# Patient Record
Sex: FEMALE | Race: WHITE | NOT HISPANIC OR LATINO | Employment: OTHER | URBAN - METROPOLITAN AREA
[De-identification: names, ages, dates, MRNs, and addresses within clinical notes are randomized per-mention and may not be internally consistent; named-entity substitution may affect disease eponyms.]

---

## 2019-01-15 ENCOUNTER — OFFICE VISIT (OUTPATIENT)
Dept: FAMILY MEDICINE | Facility: CLINIC | Age: 79
End: 2019-01-15
Payer: COMMERCIAL

## 2019-01-15 VITALS
HEIGHT: 64 IN | BODY MASS INDEX: 25.1 KG/M2 | SYSTOLIC BLOOD PRESSURE: 132 MMHG | WEIGHT: 147 LBS | OXYGEN SATURATION: 97 % | TEMPERATURE: 98 F | HEART RATE: 80 BPM | DIASTOLIC BLOOD PRESSURE: 66 MMHG

## 2019-01-15 DIAGNOSIS — R26.9 GAIT DISTURBANCE: Primary | ICD-10-CM

## 2019-01-15 DIAGNOSIS — K50.90 CROHN'S DISEASE WITHOUT COMPLICATION, UNSPECIFIED GASTROINTESTINAL TRACT LOCATION (H): ICD-10-CM

## 2019-01-15 DIAGNOSIS — Z86.73 HISTORY OF CVA (CEREBROVASCULAR ACCIDENT): ICD-10-CM

## 2019-01-15 DIAGNOSIS — Z87.74: ICD-10-CM

## 2019-01-15 PROCEDURE — 99203 OFFICE O/P NEW LOW 30 MIN: CPT | Performed by: FAMILY MEDICINE

## 2019-01-15 RX ORDER — CLOPIDOGREL BISULFATE 75 MG/1
75 TABLET ORAL DAILY
Refills: 1 | COMMUNITY
Start: 2019-01-02 | End: 2019-01-15

## 2019-01-15 RX ORDER — CHLORAL HYDRATE 500 MG
CAPSULE ORAL
COMMUNITY
Start: 2019-01-10

## 2019-01-15 RX ORDER — MULTIVITAMIN WITH IRON
1 TABLET ORAL DAILY
COMMUNITY

## 2019-01-15 RX ORDER — LOPERAMIDE HYDROCHLORIDE 2 MG/1
TABLET ORAL 2 TIMES DAILY
COMMUNITY

## 2019-01-15 RX ORDER — SIMVASTATIN 10 MG
10 TABLET ORAL DAILY
COMMUNITY
Start: 2017-12-08 | End: 2019-02-15

## 2019-01-15 RX ORDER — MERCAPTOPURINE 50 MG/1
TABLET ORAL
COMMUNITY
Start: 2010-01-01

## 2019-01-15 RX ORDER — CLOPIDOGREL BISULFATE 75 MG/1
TABLET ORAL
COMMUNITY
Start: 2008-01-16 | End: 2019-02-15

## 2019-01-15 SDOH — HEALTH STABILITY: MENTAL HEALTH: HOW OFTEN DO YOU HAVE A DRINK CONTAINING ALCOHOL?: NEVER

## 2019-01-15 ASSESSMENT — MIFFLIN-ST. JEOR: SCORE: 1131.79

## 2019-01-15 NOTE — PATIENT INSTRUCTIONS
1. Keep up the good work.    2. Let me know if I need to order physical therapy.    3. Contact your GI specialist and see if it ok to get the new Shingles vaccine (Shingrix).    4. See you after your birthday for a physical with fasting labs.

## 2019-01-16 PROBLEM — Z86.73 HISTORY OF CVA (CEREBROVASCULAR ACCIDENT): Status: ACTIVE | Noted: 2019-01-16

## 2019-01-16 PROBLEM — Z87.74: Status: ACTIVE | Noted: 2019-01-16

## 2019-01-16 PROBLEM — R26.9 GAIT DISTURBANCE: Status: ACTIVE | Noted: 2019-01-16

## 2019-01-16 PROBLEM — K50.90 CROHN'S DISEASE WITHOUT COMPLICATION, UNSPECIFIED GASTROINTESTINAL TRACT LOCATION (H): Status: ACTIVE | Noted: 2019-01-16

## 2019-02-15 DIAGNOSIS — E78.5 DYSLIPIDEMIA: ICD-10-CM

## 2019-02-15 DIAGNOSIS — Z86.73 HISTORY OF CVA (CEREBROVASCULAR ACCIDENT): Primary | ICD-10-CM

## 2019-02-18 RX ORDER — SIMVASTATIN 10 MG
10 TABLET ORAL DAILY
Qty: 90 TABLET | Refills: 0 | Status: SHIPPED | OUTPATIENT
Start: 2019-02-18 | End: 2019-04-12

## 2019-02-18 RX ORDER — CLOPIDOGREL BISULFATE 75 MG/1
TABLET ORAL
Qty: 90 TABLET | Refills: 0 | Status: SHIPPED | OUTPATIENT
Start: 2019-02-18 | End: 2019-04-22

## 2019-02-18 NOTE — TELEPHONE ENCOUNTER
Routing refill request to provider for review/approval because:  Medication is reported/historical and labs not current.  Lynn Camarillo BSN, RN

## 2019-04-01 ENCOUNTER — TRANSFERRED RECORDS (OUTPATIENT)
Dept: HEALTH INFORMATION MANAGEMENT | Facility: CLINIC | Age: 79
End: 2019-04-01

## 2019-04-01 ENCOUNTER — TELEPHONE (OUTPATIENT)
Dept: FAMILY MEDICINE | Facility: CLINIC | Age: 79
End: 2019-04-01

## 2019-04-01 NOTE — TELEPHONE ENCOUNTER
Paula from [facility] is calling for hold and bridge orders for [place].  Hold Plavix for 7 days.  Verbal orders are needed.  Please call to advise.  Thank you

## 2019-04-11 ENCOUNTER — OFFICE VISIT (OUTPATIENT)
Dept: FAMILY MEDICINE | Facility: CLINIC | Age: 79
End: 2019-04-11
Payer: COMMERCIAL

## 2019-04-11 VITALS
BODY MASS INDEX: 24.75 KG/M2 | HEIGHT: 64 IN | WEIGHT: 145 LBS | TEMPERATURE: 98.3 F | HEART RATE: 78 BPM | SYSTOLIC BLOOD PRESSURE: 128 MMHG | DIASTOLIC BLOOD PRESSURE: 72 MMHG | OXYGEN SATURATION: 96 %

## 2019-04-11 DIAGNOSIS — Z00.00 ENCOUNTER FOR PREVENTIVE HEALTH EXAMINATION: Primary | ICD-10-CM

## 2019-04-11 DIAGNOSIS — E78.5 DYSLIPIDEMIA: ICD-10-CM

## 2019-04-11 DIAGNOSIS — Z12.31 ENCOUNTER FOR SCREENING MAMMOGRAM FOR BREAST CANCER: ICD-10-CM

## 2019-04-11 DIAGNOSIS — Z78.0 POST-MENOPAUSAL: ICD-10-CM

## 2019-04-11 LAB
ALT SERPL W P-5'-P-CCNC: 27 U/L (ref 0–50)
ANION GAP SERPL CALCULATED.3IONS-SCNC: 11 MMOL/L (ref 3–14)
BASOPHILS # BLD AUTO: 0 10E9/L (ref 0–0.2)
BASOPHILS NFR BLD AUTO: 0.3 %
BUN SERPL-MCNC: 21 MG/DL (ref 7–30)
CALCIUM SERPL-MCNC: 8.8 MG/DL (ref 8.5–10.1)
CHLORIDE SERPL-SCNC: 106 MMOL/L (ref 94–109)
CHOLEST SERPL-MCNC: 142 MG/DL
CO2 SERPL-SCNC: 23 MMOL/L (ref 20–32)
CREAT SERPL-MCNC: 0.96 MG/DL (ref 0.52–1.04)
DIFFERENTIAL METHOD BLD: NORMAL
EOSINOPHIL # BLD AUTO: 0.1 10E9/L (ref 0–0.7)
EOSINOPHIL NFR BLD AUTO: 0.9 %
ERYTHROCYTE [DISTWIDTH] IN BLOOD BY AUTOMATED COUNT: 14.6 % (ref 10–15)
GFR SERPL CREATININE-BSD FRML MDRD: 56 ML/MIN/{1.73_M2}
GLUCOSE SERPL-MCNC: 93 MG/DL (ref 70–99)
HCT VFR BLD AUTO: 41.3 % (ref 35–47)
HDLC SERPL-MCNC: 52 MG/DL
HGB BLD-MCNC: 13.7 G/DL (ref 11.7–15.7)
LDLC SERPL CALC-MCNC: 68 MG/DL
LYMPHOCYTES # BLD AUTO: 1.7 10E9/L (ref 0.8–5.3)
LYMPHOCYTES NFR BLD AUTO: 24.2 %
MCH RBC QN AUTO: 32.9 PG (ref 26.5–33)
MCHC RBC AUTO-ENTMCNC: 33.2 G/DL (ref 31.5–36.5)
MCV RBC AUTO: 99 FL (ref 78–100)
MONOCYTES # BLD AUTO: 0.5 10E9/L (ref 0–1.3)
MONOCYTES NFR BLD AUTO: 7.4 %
NEUTROPHILS # BLD AUTO: 4.6 10E9/L (ref 1.6–8.3)
NEUTROPHILS NFR BLD AUTO: 67.2 %
NONHDLC SERPL-MCNC: 90 MG/DL
PLATELET # BLD AUTO: 248 10E9/L (ref 150–450)
POTASSIUM SERPL-SCNC: 4.1 MMOL/L (ref 3.4–5.3)
RBC # BLD AUTO: 4.16 10E12/L (ref 3.8–5.2)
SODIUM SERPL-SCNC: 140 MMOL/L (ref 133–144)
TRIGL SERPL-MCNC: 111 MG/DL
WBC # BLD AUTO: 6.9 10E9/L (ref 4–11)

## 2019-04-11 PROCEDURE — 84460 ALANINE AMINO (ALT) (SGPT): CPT | Performed by: FAMILY MEDICINE

## 2019-04-11 PROCEDURE — 80048 BASIC METABOLIC PNL TOTAL CA: CPT | Performed by: FAMILY MEDICINE

## 2019-04-11 PROCEDURE — 80061 LIPID PANEL: CPT | Performed by: FAMILY MEDICINE

## 2019-04-11 PROCEDURE — 85025 COMPLETE CBC W/AUTO DIFF WBC: CPT | Performed by: FAMILY MEDICINE

## 2019-04-11 PROCEDURE — 99397 PER PM REEVAL EST PAT 65+ YR: CPT | Performed by: FAMILY MEDICINE

## 2019-04-11 PROCEDURE — 36415 COLL VENOUS BLD VENIPUNCTURE: CPT | Performed by: FAMILY MEDICINE

## 2019-04-11 ASSESSMENT — ENCOUNTER SYMPTOMS
DIARRHEA: 0
BREAST MASS: 0
SORE THROAT: 0
DYSURIA: 0
HEMATURIA: 0
PARESTHESIAS: 1
FREQUENCY: 0
MYALGIAS: 0
JOINT SWELLING: 0
NAUSEA: 0
ABDOMINAL PAIN: 0
HEADACHES: 0
COUGH: 0
PALPITATIONS: 0
DIZZINESS: 0
CHILLS: 0
HEARTBURN: 0
ARTHRALGIAS: 0
CONSTIPATION: 0
NERVOUS/ANXIOUS: 0
SHORTNESS OF BREATH: 0
HEMATOCHEZIA: 0
FEVER: 0
EYE PAIN: 0
WEAKNESS: 0

## 2019-04-11 ASSESSMENT — MIFFLIN-ST. JEOR: SCORE: 1122.72

## 2019-04-11 ASSESSMENT — ACTIVITIES OF DAILY LIVING (ADL): CURRENT_FUNCTION: HOUSEWORK REQUIRES ASSISTANCE

## 2019-04-11 NOTE — LETTER
April 12, 2019    [facility]  [address] [facility] [address]        Kat Fraser,    Your cholesterol was excellent. I have refilled the Simvastatin at the same dose.    Your glucose was a bit low which is understandable since you were fasting.    All other labs were fine.    Regards,    Stevo Penaloza M.D.    Results for orders placed or performed in visit on 04/11/19   Basic metabolic panel   Result Value Ref Range    Sodium 140 133 - 144 mmol/L    Potassium 4.1 3.4 - 5.3 mmol/L    Chloride 106 94 - 109 mmol/L    Carbon Dioxide 23 20 - 32 mmol/L    Anion Gap 11 3 - 14 mmol/L    Glucose 93 70 - 99 mg/dL    Urea Nitrogen 21 7 - 30 mg/dL    Creatinine 0.96 0.52 - 1.04 mg/dL    GFR Estimate 56 (L) >60 mL/min/[1.73_m2]    GFR Estimate If Black 65 >60 mL/min/[1.73_m2]    Calcium 8.8 8.5 - 10.1 mg/dL   CBC with platelets differential   Result Value Ref Range    WBC 6.9 4.0 - 11.0 10e9/L    RBC Count 4.16 3.8 - 5.2 10e12/L    Hemoglobin 13.7 11.7 - 15.7 g/dL    Hematocrit 41.3 35.0 - 47.0 %    MCV 99 78 - 100 fl    MCH 32.9 26.5 - 33.0 pg    MCHC 33.2 31.5 - 36.5 g/dL    RDW 14.6 10.0 - 15.0 %    Platelet Count 248 150 - 450 10e9/L    % Neutrophils 67.2 %    % Lymphocytes 24.2 %    % Monocytes 7.4 %    % Eosinophils 0.9 %    % Basophils 0.3 %    Absolute Neutrophil 4.6 1.6 - 8.3 10e9/L    Absolute Lymphocytes 1.7 0.8 - 5.3 10e9/L    Absolute Monocytes 0.5 0.0 - 1.3 10e9/L    Absolute Eosinophils 0.1 0.0 - 0.7 10e9/L    Absolute Basophils 0.0 0.0 - 0.2 10e9/L    Diff Method Automated Method    Lipid panel reflex to direct LDL Fasting   Result Value Ref Range    Cholesterol 142 <200 mg/dL    Triglycerides 111 <150 mg/dL    HDL Cholesterol 52 >49 mg/dL    LDL Cholesterol Calculated 68 <100 mg/dL    Non HDL Cholesterol 90 <130 mg/dL   ALT   Result Value Ref Range    ALT 27 0 - 50 U/L

## 2019-04-11 NOTE — PATIENT INSTRUCTIONS
1. I recommend including veggies, fresh fruits (3 to 5 servings), nuts (unsalted) in your daily diet. Cut down on carbs like bread, pasta, rice, potatoes. Cut down on red meats like burgers etc. Increase healthy proteins like beans, tofu, tuna, chicken and turkey.    2. Get regular exercise like walking, biking, swimming at least 3 times per week (150 minutes per week).      3. Do self breast exams monthly. Report any lumps.    4. Avoid the sun or otherwise use sun screen - please look at the guide I gave you about melanoma.    5. See the dentist and eye doctor regularly.     6. Please call [phone] to set up an appointment to discuss advanced directive.     7. Ask your GI specialist if you can have the new Shingles vaccine - then stop by our pharmacy and get the vaccine.    8. Set up mammogram and bone density tests - [phone].    9. I will contact you about your test results - I will refill your Simvastatin then.    10. If all is well, see you in one year for a physical with fasting labs.

## 2019-04-11 NOTE — PROGRESS NOTES
"SUBJECTIVE:   Bria Long is a 78 year old female who presents for Preventive Visit.    Are you in the first 12 months of your Medicare coverage?  No    Healthy Habits:     In general, how would you rate your overall health?  Good    Frequency of exercise:  2-3 days/week    Duration of exercise:  30-45 minutes    Do you usually eat at least 4 servings of fruit and vegetables a day, include whole grains    & fiber and avoid regularly eating high fat or \"junk\" foods?  No    Taking medications regularly:  Yes    Medication side effects:  None    Ability to successfully perform activities of daily living:  Housework requires assistance    Home Safety:  Throw rugs in the hallway and lack of grab bars in the bathroom    Hearing Impairment:  Need to ask people to speak up or repeat themselves    In the past 6 months, have you been bothered by leaking of urine? Yes    In general, how would you rate your overall mental or emotional health?  Good      PHQ-2 Total Score: 0    Additional concerns today:  No    Do you feel safe in your environment? Yes    Do you have a Health Care Directive? No: Advance care planning was reviewed with patient; patient declined at this time.    Fall risk  Fallen 2 or more times in the past year?: No  Any fall with injury in the past year?: No    Cognitive Screening   1) Repeat 3 items (Leader, Season, Table)    2) Clock draw: NORMAL  3) 3 item recall: Recalls 3 objects  Results: NORMAL clock, 1-2 items recalled: COGNITIVE IMPAIRMENT LESS LIKELY    Mini-CogTM Copyright NADIYA Suarez. Licensed by the author for use in [facility]; reprinted with permission (daina@.[facility]). All rights reserved.      Do you have sleep apnea, excessive snoring or daytime drowsiness?: no    Reviewed and updated as needed this visit by clinical staff         Reviewed and updated as needed this visit by Provider        Social History     Tobacco Use     Smoking status: Former Smoker     Last attempt to " quit: 1985     Years since quittin.7     Smokeless tobacco: Never Used   Substance Use Topics     Alcohol use: No     Frequency: Never     If you drink alcohol do you typically have >3 drinks per day or >7 drinks per week? No    Alcohol Use 2019   Prescreen: >3 drinks/day or >7 drinks/week? No   No flowsheet data found.    She previously followed with [facility].    Crohn's - she has cramping and diarrhea off and on.  Evaluation and treatment:    Follows with [facility]. colonoscopy is planned.   Takes Imodium and Mercaptopurine.    CVA, ASD (s/p percutaneous closure) - the only neurological issue she has is poor balance.  Evaluation and treatment:    Percutaneous ASD closure on 1/15/08.   Plavix 75 mg daily.   I offered referral to PT but she says she will set this up.     Dyslipidemia - has history of CVA.   Evaluation and treatment:   Per ATP, no statin recommended.   Zocor 10 mg daily - no side effects.   Continue same tx.   Counseling done today regarding healthy weight, diet and exercise.     Recent Labs   Lab Test 19  1021   CHOL 142   HDL 52   LDL 68   TRIG 111     Lab Results   Component Value Date    ALT 27 2019         Preventive - advised to get Shingrix from our pharmacy after clearing it with her GI specialist. Declines flu shot today, citing she does not get these at all.    Immunization History   Administered Date(s) Administered     Pneumo Conj 13-V (2010&after) 2015     Pneumococcal 23 valent 2005     TD (ADULT, 7+) 2009     TDAP Vaccine (Adacel) 2015     Zoster vaccine, live 2011     -Mammogram: ordered    -PAP: n/a due to age    -DEXA: ordered    - Colon CA screen: see crohn's above.    Diabetes screen:     Last Comprehensive Metabolic Panel:  Sodium   Date Value Ref Range Status   2019 140 133 - 144 mmol/L Final     Potassium   Date Value Ref Range Status   2019 4.1 3.4 - 5.3 mmol/L Final     Chloride   Date Value Ref Range  Status   04/11/2019 106 94 - 109 mmol/L Final     Carbon Dioxide   Date Value Ref Range Status   04/11/2019 23 20 - 32 mmol/L Final     Anion Gap   Date Value Ref Range Status   04/11/2019 11 3 - 14 mmol/L Final     Glucose   Date Value Ref Range Status   04/11/2019 93 70 - 99 mg/dL Final     Comment:     Fasting specimen     Urea Nitrogen   Date Value Ref Range Status   04/11/2019 21 7 - 30 mg/dL Final     Creatinine   Date Value Ref Range Status   04/11/2019 0.96 0.52 - 1.04 mg/dL Final     GFR Estimate   Date Value Ref Range Status   04/11/2019 56 (L) >60 mL/min/[1.73_m2] Final     Comment:     Non  GFR Calc  Starting 12/18/2018, serum creatinine based estimated GFR (eGFR) will be   calculated using the Chronic Kidney Disease Epidemiology Collaboration   (CKD-EPI) equation.       Calcium   Date Value Ref Range Status   04/11/2019 8.8 8.5 - 10.1 mg/dL Final     Anemia screen:    CBC RESULTS:   Recent Labs   Lab Test 04/11/19  1021   WBC 6.9   RBC 4.16   HGB 13.7   HCT 41.3   MCV 99   MCH 32.9   MCHC 33.2   RDW 14.6              SH:    Marital status:  - lives in [place]  Kids: 3 daughters  Employment: retired, PCA on weekends, volunteering  Exercise: walks her dog  Tobacco: no  Etoh: no  Recreational drugs: no  Caffeine: no            Patient Care Team:  [facility] as PCP - General (Clinic)  Stevo Penaloza MD as Assigned PCP    The following health maintenance items are reviewed in Epic and correct as of today:  Health Maintenance   Topic Date Due     PHQ-2 Q1 YR  04/28/1952     LIPID SCREEN Q5 YR FEMALE (SYSTEM ASSIGNED)  04/28/1985     ADVANCE DIRECTIVE PLANNING Q5 YRS  04/28/1995     MEDICARE ANNUAL WELLNESS VISIT  04/28/2005     FALL RISK ASSESSMENT  04/28/2005     DEXA SCAN SCREENING (SYSTEM ASSIGNED)  04/28/2005     ZOSTER IMMUNIZATION (2 of 3) 09/28/2011     INFLUENZA VACCINE (1) 09/01/2018     DTAP/TDAP/TD IMMUNIZATION (3 - Td) 05/11/2025     PNEUMOCOCCAL  "IMMUNIZATION 65+ LOW/MEDIUM RISK  Completed     IPV IMMUNIZATION  Aged Out     MENINGITIS IMMUNIZATION  Aged Out           Review of Systems   Constitutional: Negative for chills and fever.   HENT: Negative for congestion, ear pain, hearing loss and sore throat.    Eyes: Negative for pain and visual disturbance.   Respiratory: Negative for cough and shortness of breath.    Cardiovascular: Negative for chest pain, palpitations and peripheral edema.   Gastrointestinal: Negative for abdominal pain, constipation, diarrhea, heartburn, hematochezia and nausea.   Breasts:  Negative for tenderness, breast mass and discharge.   Genitourinary: Negative for dysuria, frequency, genital sores, hematuria, pelvic pain, urgency, vaginal bleeding and vaginal discharge.   Musculoskeletal: Negative for arthralgias, joint swelling and myalgias.   Skin: Negative for rash.   Neurological: Positive for paresthesias. Negative for dizziness, weakness and headaches.   Psychiatric/Behavioral: Negative for mood changes. The patient is not nervous/anxious.          OBJECTIVE:   /72 (Cuff Size: Adult Regular)   Pulse 78   Temp 98.3  F (36.8  C) (Oral)   Ht 1.626 m (5' 4\")   Wt 65.8 kg (145 lb)   SpO2 96%   Breastfeeding? No   BMI 24.89 kg/m   Estimated body mass index is 24.89 kg/m  as calculated from the following:    Height as of this encounter: 1.626 m (5' 4\").    Weight as of this encounter: 65.8 kg (145 lb).  Physical Exam  GENERAL: healthy, alert and no distress  EYES: Eyes grossly normal to inspection, PERRL and conjunctivae and sclerae normal  HENT: ear canals and TM's normal, nose and mouth without ulcers or lesions  NECK: no adenopathy, no asymmetry, masses, or scars and thyroid normal to palpation  RESP: lungs clear to auscultation - no rales, rhonchi or wheezes  BREAST: normal without masses, tenderness or nipple discharge and no palpable axillary masses or adenopathy  CV: regular rate and rhythm, normal S1 S2, no S3 or " "S4, no murmur, click or rub, no peripheral edema and peripheral pulses strong  ABDOMEN: soft, nontender, no hepatosplenomegaly, no masses and bowel sounds normal  MS: no gross musculoskeletal defects noted, no edema  SKIN: no suspicious lesions or rashes  NEURO: Normal strength and tone, mentation intact and speech normal  PSYCH: mentation appears normal, affect normal/bright      ASSESSMENT / PLAN:       End of Life Planning:  Patient currently has an advanced directive: No.  I have verified the patient's ablity to prepare an advanced directive/make health care decisions.  Literature was provided to assist patient in preparing an advanced directive.    COUNSELING:  Reviewed preventive health counseling, as reflected in patient instructions       Regular exercise       Healthy diet/nutrition    BP Readings from Last 1 Encounters:   04/11/19 128/72     Estimated body mass index is 24.89 kg/m  as calculated from the following:    Height as of this encounter: 1.626 m (5' 4\").    Weight as of this encounter: 65.8 kg (145 lb).           reports that she quit smoking about 33 years ago. She has never used smokeless tobacco.      Appropriate preventive services were discussed with this patient, including applicable screening as appropriate for cardiovascular disease, diabetes, osteopenia/osteoporosis, and glaucoma.  As appropriate for age/gender, discussed screening for colorectal cancer, prostate cancer, breast cancer, and cervical cancer. Checklist reviewing preventive services available has been given to the patient.    Reviewed patients plan of care and provided an AVS. The Basic Care Plan (routine screening as documented in Health Maintenance) for Bria meets the Care Plan requirement. This Care Plan has been established and reviewed with the Patient.    Assessment and Plan - Decision Making    1. Encounter for preventive health examination    Results of today's exam given to patient verbally along with age " appropriate preventive measures. Written instructions reviewed and handed to the patient.    - Basic metabolic panel  - CBC with platelets differential    2. Encounter for screening mammogram for breast cancer    Per HPI    - *MA Screening Digital Bilateral; Future    3. Post-menopausal    Per HPI    - DX Hip/Pelvis/Spine; Future    4. Dyslipidemia    Per HPI    - Lipid panel reflex to direct LDL Fasting  - ALT  - simvastatin (ZOCOR) 10 MG tablet; Take 1 tablet (10 mg) by mouth daily  Dispense: 90 tablet; Refill: 3      Written instructions given as follows:    Patient Instructions   1. I recommend including veggies, fresh fruits (3 to 5 servings), nuts (unsalted) in your daily diet. Cut down on carbs like bread, pasta, rice, potatoes. Cut down on red meats like burgers etc. Increase healthy proteins like beans, tofu, tuna, chicken and turkey.    2. Get regular exercise like walking, biking, swimming at least 3 times per week (150 minutes per week).      3. Do self breast exams monthly. Report any lumps.    4. Avoid the sun or otherwise use sun screen - please look at the guide I gave you about melanoma.    5. See the dentist and eye doctor regularly.     6. Please call [phone] to set up an appointment to discuss advanced directive.     7. Ask your GI specialist if you can have the new Shingles vaccine - then stop by our pharmacy and get the vaccine.    8. Set up mammogram and bone density tests - [phone].    9. I will contact you about your test results - I will refill your Simvastatin then.    10. If all is well, see you in one year for a physical with fasting labs.

## 2019-04-12 ENCOUNTER — TELEPHONE (OUTPATIENT)
Dept: FAMILY MEDICINE | Facility: CLINIC | Age: 79
End: 2019-04-12

## 2019-04-12 RX ORDER — SIMVASTATIN 10 MG
10 TABLET ORAL DAILY
Qty: 90 TABLET | Refills: 3 | Status: SHIPPED | OUTPATIENT
Start: 2019-04-12 | End: 2019-06-11

## 2019-04-12 NOTE — TELEPHONE ENCOUNTER
Patient calling was seen in clinic recently was told to check with her GI Dr to see if she could have the new shingles shot and was told she could have the shingrix-inactive shot. Please call with questions or to discuss.

## 2019-04-12 NOTE — RESULT ENCOUNTER NOTE
Please mail letter:    Kat Fraser,    Your cholesterol was excellent. I have refilled the Simvastatin at the same dose.    Your glucose was a bit low which is understandable since you were fasting.    All other labs were fine.    Regards,    Stevo Penaloza M.D.

## 2019-04-18 NOTE — TELEPHONE ENCOUNTER
Called patient @9:39 AM.  Verified patient's .    Advised patient to come into any pharmacy and receive the shingles vaccine.    Patient stated she will go sometime today.    Meghan Au LPN

## 2019-04-19 ENCOUNTER — ANCILLARY PROCEDURE (OUTPATIENT)
Dept: MAMMOGRAPHY | Facility: CLINIC | Age: 79
End: 2019-04-19
Attending: FAMILY MEDICINE
Payer: COMMERCIAL

## 2019-04-19 ENCOUNTER — ANCILLARY PROCEDURE (OUTPATIENT)
Dept: BONE DENSITY | Facility: CLINIC | Age: 79
End: 2019-04-19
Attending: FAMILY MEDICINE
Payer: COMMERCIAL

## 2019-04-19 DIAGNOSIS — Z12.31 ENCOUNTER FOR SCREENING MAMMOGRAM FOR BREAST CANCER: ICD-10-CM

## 2019-04-19 DIAGNOSIS — Z78.0 POST-MENOPAUSAL: ICD-10-CM

## 2019-04-19 PROCEDURE — 77067 SCR MAMMO BI INCL CAD: CPT | Mod: TC

## 2019-04-19 PROCEDURE — 77080 DXA BONE DENSITY AXIAL: CPT | Performed by: INTERNAL MEDICINE

## 2019-04-22 DIAGNOSIS — Z86.73 HISTORY OF CVA (CEREBROVASCULAR ACCIDENT): ICD-10-CM

## 2019-04-22 RX ORDER — CLOPIDOGREL BISULFATE 75 MG/1
TABLET ORAL
Qty: 90 TABLET | Refills: 3 | Status: SHIPPED | OUTPATIENT
Start: 2019-04-22 | End: 2020-03-31

## 2019-04-25 ENCOUNTER — TRANSFERRED RECORDS (OUTPATIENT)
Dept: HEALTH INFORMATION MANAGEMENT | Facility: CLINIC | Age: 79
End: 2019-04-25

## 2019-05-29 ENCOUNTER — TRANSFERRED RECORDS (OUTPATIENT)
Dept: HEALTH INFORMATION MANAGEMENT | Facility: CLINIC | Age: 79
End: 2019-05-29

## 2019-06-11 DIAGNOSIS — E78.5 DYSLIPIDEMIA: ICD-10-CM

## 2019-06-13 RX ORDER — SIMVASTATIN 10 MG
TABLET ORAL
Qty: 90 TABLET | Refills: 2 | Status: SHIPPED | OUTPATIENT
Start: 2019-06-13 | End: 2020-06-30

## 2019-06-17 ENCOUNTER — TRANSFERRED RECORDS (OUTPATIENT)
Dept: HEALTH INFORMATION MANAGEMENT | Facility: CLINIC | Age: 79
End: 2019-06-17

## 2019-08-06 ENCOUNTER — TELEPHONE (OUTPATIENT)
Dept: FAMILY MEDICINE | Facility: CLINIC | Age: 79
End: 2019-08-06

## 2019-08-06 NOTE — TELEPHONE ENCOUNTER
Left message on answering machine for patient to call back.  [phone]  Sophy SALAZARN, RN, CPN

## 2019-08-06 NOTE — TELEPHONE ENCOUNTER
Patient c/o dry mouth, hx of chron's, eats a lot of sweets  Patient does not have a family history of diabetes that she is aware of  Would like to be checked to rule out diabetes    Instructed patient needs to be seen to discuss concerns/symptoms    Appointment 8/16 with Dr. Ca SALAZARN, RN, CPN

## 2019-08-16 ENCOUNTER — OFFICE VISIT (OUTPATIENT)
Dept: FAMILY MEDICINE | Facility: CLINIC | Age: 79
End: 2019-08-16
Payer: COMMERCIAL

## 2019-08-16 VITALS
WEIGHT: 144 LBS | BODY MASS INDEX: 24.72 KG/M2 | DIASTOLIC BLOOD PRESSURE: 61 MMHG | SYSTOLIC BLOOD PRESSURE: 134 MMHG | TEMPERATURE: 97.8 F | OXYGEN SATURATION: 97 % | HEART RATE: 67 BPM

## 2019-08-16 DIAGNOSIS — R68.2 DRY MOUTH: ICD-10-CM

## 2019-08-16 DIAGNOSIS — H81.10 BENIGN PAROXYSMAL POSITIONAL VERTIGO, UNSPECIFIED LATERALITY: Primary | ICD-10-CM

## 2019-08-16 PROCEDURE — 99214 OFFICE O/P EST MOD 30 MIN: CPT | Performed by: FAMILY MEDICINE

## 2019-08-16 NOTE — PROGRESS NOTES
HPI:    Bria is a 79 year old female here to discuss:    Dry mouth - she is bothered by this. She denies dry eyes.   Evaluation and treatment:    We went through diff dx including medication side effects vs Sjogren's.   She has tried water and Biotene.   If it gets worse, she can let me know - we can have her seen specialist.    BPV - she has baseline hearing loss and she wears right hearing aide. Lately she has movement/spinnin sensation off and on. This is not frequent and lasts only a few seconds. It seems to happen when she moves her head quickly. She denies syncope, near syncope, chest pain, shortness of breath or palpitations. She has baseline poor balance owing to history of CVA.  Evaluation and treatment:    Since the symptoms are mild, we agreed on observation.   She will report worsening symptoms.    The following reviewed but not addressed today:    Crohn's - she has cramping and diarrhea off and on.  Evaluation and treatment:    Follows with [facility]. colonoscopy is planned.   Takes Imodium and Mercaptopurine.    CVA, ASD (s/p percutaneous closure) - the only neurological issue she has is poor balance.  Evaluation and treatment:    Percutaneous ASD closure on 1/15/08.   Plavix 75 mg daily.   I offered referral to PT but she says she will set this up.     Dyslipidemia - has history of CVA.   Evaluation and treatment:   Per ATP, no statin recommended.   Zocor 10 mg daily - no side effects.   Continue same tx.   Counseling done today regarding healthy weight, diet and exercise.     Recent Labs   Lab Test 04/11/19  1021   CHOL 142   HDL 52   LDL 68   TRIG 111     Lab Results   Component Value Date    ALT 27 04/11/2019         Preventive - Declines flu shot today, citing she does not get these at all.    Immunization History   Administered Date(s) Administered     Pneumo Conj 13-V (2010&after) 05/11/2015     Pneumococcal 23 valent 05/16/2005     TD (ADULT, 7+) 05/27/2009     TDAP Vaccine (Adacel) 05/11/2015      Zoster vaccine recombinant adjuvanted (SHINGRIX) 04/18/2019, 07/03/2019     Zoster vaccine, live 08/03/2011     -Mammogram: negative 4/19/19    -PAP: n/a due to age    -DEXA: negative 4/19/19    - Colon CA screen: see crohn's above.    Diabetes screen:     Last Comprehensive Metabolic Panel:  Sodium   Date Value Ref Range Status   04/11/2019 140 133 - 144 mmol/L Final     Potassium   Date Value Ref Range Status   04/11/2019 4.1 3.4 - 5.3 mmol/L Final     Chloride   Date Value Ref Range Status   04/11/2019 106 94 - 109 mmol/L Final     Carbon Dioxide   Date Value Ref Range Status   04/11/2019 23 20 - 32 mmol/L Final     Anion Gap   Date Value Ref Range Status   04/11/2019 11 3 - 14 mmol/L Final     Glucose   Date Value Ref Range Status   04/11/2019 93 70 - 99 mg/dL Final     Comment:     Fasting specimen     Urea Nitrogen   Date Value Ref Range Status   04/11/2019 21 7 - 30 mg/dL Final     Creatinine   Date Value Ref Range Status   04/11/2019 0.96 0.52 - 1.04 mg/dL Final     GFR Estimate   Date Value Ref Range Status   04/11/2019 56 (L) >60 mL/min/[1.73_m2] Final     Comment:     Non  GFR Calc  Starting 12/18/2018, serum creatinine based estimated GFR (eGFR) will be   calculated using the Chronic Kidney Disease Epidemiology Collaboration   (CKD-EPI) equation.       Calcium   Date Value Ref Range Status   04/11/2019 8.8 8.5 - 10.1 mg/dL Final     Anemia screen:    CBC RESULTS:   Recent Labs   Lab Test 04/11/19  1021   WBC 6.9   RBC 4.16   HGB 13.7   HCT 41.3   MCV 99   MCH 32.9   MCHC 33.2   RDW 14.6              SH:    Marital status:  - lives in [place]  Kids: 3 daughters  Employment: retired, PCA on weekends, volunteering  Exercise: walks her dog  Tobacco: no  Etoh: no  Recreational drugs: no  Caffeine: no    Exam:    /61 (Cuff Size: Adult Regular)   Pulse 67   Temp 97.8  F (36.6  C) (Oral)   Wt 65.3 kg (144 lb)   SpO2 97%   Breastfeeding? No   BMI 24.72 kg/m       Gen: Healthy appearing female in no acute distress  ENT: TM's normal after having her remove right hearing aide. Oropharynx normal. Oral mucosa dry without lesions.  Eyes: Conjunctiva and sclera normal. Pupils react normally to light. No nystagmus.  Neck: No enlarged lymph nodes, thyromegally or other masses.  Lungs: Good air movement and otherwise clear.  CV: Heart RRR with no murmurs. No JVD, carotid bruits or leg edema.    Assessment and Plan - Decision Making    1. Benign paroxysmal positional vertigo, unspecified laterality    Per HPI      2. Dry mouth    Per HPI        Written instructions given as follows:    Patient Instructions   1. Please let me know if the symptoms get worse - we can then refer you to a specialist.    2. Otherwise see you in April for a physical with fasting labs.

## 2019-08-16 NOTE — PATIENT INSTRUCTIONS
1. Please let me know if the symptoms get worse - we can then refer you to a specialist.    2. Otherwise see you in April for a physical with fasting labs.

## 2019-08-18 PROBLEM — H81.10 BENIGN PAROXYSMAL POSITIONAL VERTIGO, UNSPECIFIED LATERALITY: Status: ACTIVE | Noted: 2019-08-18

## 2019-08-18 PROBLEM — R68.2 DRY MOUTH: Status: ACTIVE | Noted: 2019-08-18

## 2019-09-09 ENCOUNTER — TRANSFERRED RECORDS (OUTPATIENT)
Dept: HEALTH INFORMATION MANAGEMENT | Facility: CLINIC | Age: 79
End: 2019-09-09

## 2019-12-30 ENCOUNTER — TRANSFERRED RECORDS (OUTPATIENT)
Dept: HEALTH INFORMATION MANAGEMENT | Facility: CLINIC | Age: 79
End: 2019-12-30

## 2020-03-23 ENCOUNTER — TRANSFERRED RECORDS (OUTPATIENT)
Dept: HEALTH INFORMATION MANAGEMENT | Facility: CLINIC | Age: 80
End: 2020-03-23

## 2020-03-30 DIAGNOSIS — Z86.73 HISTORY OF CVA (CEREBROVASCULAR ACCIDENT): ICD-10-CM

## 2020-03-31 RX ORDER — CLOPIDOGREL BISULFATE 75 MG/1
TABLET ORAL
Qty: 90 TABLET | Refills: 0 | Status: SHIPPED | OUTPATIENT
Start: 2020-03-31 | End: 2020-06-30

## 2020-03-31 NOTE — TELEPHONE ENCOUNTER
Patient is due for an office visit April 2020.  Patient is due for an office visit.  Please advise, would you like patient to do a Virtual Visit for follow up?  If so, which one?   Glenny Keene RN

## 2020-05-11 ENCOUNTER — TRANSFERRED RECORDS (OUTPATIENT)
Dept: HEALTH INFORMATION MANAGEMENT | Facility: CLINIC | Age: 80
End: 2020-05-11

## 2020-05-21 ENCOUNTER — ANCILLARY PROCEDURE (OUTPATIENT)
Dept: MAMMOGRAPHY | Facility: CLINIC | Age: 80
End: 2020-05-21
Attending: FAMILY MEDICINE
Payer: COMMERCIAL

## 2020-05-21 DIAGNOSIS — Z12.31 SCREENING MAMMOGRAM, ENCOUNTER FOR: ICD-10-CM

## 2020-05-21 PROCEDURE — 77067 SCR MAMMO BI INCL CAD: CPT | Mod: TC

## 2020-06-15 ENCOUNTER — TRANSFERRED RECORDS (OUTPATIENT)
Dept: HEALTH INFORMATION MANAGEMENT | Facility: CLINIC | Age: 80
End: 2020-06-15

## 2020-06-15 LAB
ALT SERPL-CCNC: 27 U/L (ref 0–78)
AST SERPL-CCNC: 22 U/L (ref 0–37)
CREAT SERPL-MCNC: 1.09 MG/DL (ref 0.6–1.02)

## 2020-06-23 DIAGNOSIS — E78.5 DYSLIPIDEMIA: ICD-10-CM

## 2020-06-23 DIAGNOSIS — Z86.73 HISTORY OF CVA (CEREBROVASCULAR ACCIDENT): ICD-10-CM

## 2020-06-25 NOTE — TELEPHONE ENCOUNTER
Routing refill request to provider for review/approval because:  Labs not current:  Hgb, platelet, lipids    Lynn Dodge BSN, RN

## 2020-06-26 RX ORDER — CLOPIDOGREL BISULFATE 75 MG/1
TABLET ORAL
Qty: 90 TABLET | Refills: 0 | OUTPATIENT
Start: 2020-06-26

## 2020-06-26 RX ORDER — SIMVASTATIN 10 MG
TABLET ORAL
Qty: 90 TABLET | Refills: 2 | OUTPATIENT
Start: 2020-06-26

## 2020-06-30 ENCOUNTER — VIRTUAL VISIT (OUTPATIENT)
Dept: FAMILY MEDICINE | Facility: CLINIC | Age: 80
End: 2020-06-30
Payer: COMMERCIAL

## 2020-06-30 DIAGNOSIS — Z86.73 HISTORY OF CVA (CEREBROVASCULAR ACCIDENT): ICD-10-CM

## 2020-06-30 DIAGNOSIS — E78.5 DYSLIPIDEMIA: ICD-10-CM

## 2020-06-30 PROCEDURE — 99213 OFFICE O/P EST LOW 20 MIN: CPT | Mod: 95 | Performed by: FAMILY MEDICINE

## 2020-06-30 RX ORDER — SIMVASTATIN 10 MG
TABLET ORAL
Qty: 90 TABLET | Refills: 1 | Status: SHIPPED | OUTPATIENT
Start: 2020-06-30 | End: 2020-12-17

## 2020-06-30 RX ORDER — CLOPIDOGREL BISULFATE 75 MG/1
75 TABLET ORAL DAILY
Qty: 90 TABLET | Refills: 1 | Status: SHIPPED | OUTPATIENT
Start: 2020-06-30 | End: 2020-12-17

## 2020-06-30 NOTE — PROGRESS NOTES
"Bria Long is a 80 year old female who is being evaluated via a billable telephone visit.      The patient has been notified of following:     \"This telephone visit will be conducted via a call between you and your physician/provider. We have found that certain health care needs can be provided without the need for a physical exam.  This service lets us provide the care you need with a short phone conversation.  If a prescription is necessary we can send it directly to your pharmacy.  If lab work is needed we can place an order for that and you can then stop by our lab to have the test done at a later time.    Telephone visits are billed at different rates depending on your insurance coverage. During this emergency period, for some insurers they may be billed the same as an in-person visit.  Please reach out to your insurance provider with any questions.    If during the course of the call the physician/provider feels a telephone visit is not appropriate, you will not be charged for this service.\"    Patient has given verbal consent for Telephone visit?  Yes    What phone number would you like to be contacted at? [phone]    How would you like to obtain your AVS? Mail a copy    Subjective     Bria Long is a 80 year old female who presents via phone visit today for the following health issues:    HPI    Previous dry mouth - she is bothered by this. She denies dry eyes.   Evaluation and treatment:    We went through diff dx including medication side effects vs Sjogren's.   She has tried water and Biotene.   If it gets worse, she can let me know - we can have her seen specialist.    BPV - she has baseline hearing loss and she wears right hearing aide. Lately she has movement/spinnin sensation off and on. This is not frequent and lasts only a few seconds. It seems to happen when she moves her head quickly. She denies syncope, near syncope, chest pain, shortness of breath or palpitations. She has " baseline poor balance owing to history of CVA.  Evaluation and treatment:    Since the symptoms are mild, we agreed on observation.   She will report worsening symptoms.    Crohn's - she has cramping and diarrhea off and on.  Evaluation and treatment:    Follows with [facility]. colonoscopy is planned.   Takes Imodium and Mercaptopurine.    CVA, ASD (s/p percutaneous closure) - the only neurological issue she has is poor balance.  Evaluation and treatment:    Percutaneous ASD closure on 1/15/08.   Plavix 75 mg daily.   Continue same tx.    Dyslipidemia - has history of CVA.   Evaluation and treatment:   Per ATP, no statin recommended.   Zocor 10 mg daily - no side effects.   Continue same tx.   Counseling done today regarding healthy weight, diet and exercise.     Recent Labs   Lab Test 04/11/19  1021   CHOL 142   HDL 52   LDL 68   TRIG 111     Lab Results   Component Value Date    ALT 27 04/11/2019     Preventive - Declines flu shot today, citing she does not get these at all.    Immunization History   Administered Date(s) Administered     Pneumo Conj 13-V (2010&after) 05/11/2015     Pneumococcal 23 valent 05/16/2005     TD (ADULT, 7+) 05/27/2009     TDAP Vaccine (Adacel) 05/11/2015     Zoster vaccine recombinant adjuvanted (SHINGRIX) 04/18/2019, 07/03/2019     Zoster vaccine, live 08/03/2011     -Mammogram: negative 4/19/19    -PAP: n/a due to age    -DEXA: negative 4/19/19    - Colon CA screen: see crohn's above.    Diabetes screen:     Last Comprehensive Metabolic Panel:  Sodium   Date Value Ref Range Status   04/11/2019 140 133 - 144 mmol/L Final     Potassium   Date Value Ref Range Status   04/11/2019 4.1 3.4 - 5.3 mmol/L Final     Chloride   Date Value Ref Range Status   04/11/2019 106 94 - 109 mmol/L Final     Carbon Dioxide   Date Value Ref Range Status   04/11/2019 23 20 - 32 mmol/L Final     Anion Gap   Date Value Ref Range Status   04/11/2019 11 3 - 14 mmol/L Final     Glucose   Date Value Ref Range Status    04/11/2019 93 70 - 99 mg/dL Final     Comment:     Fasting specimen     Urea Nitrogen   Date Value Ref Range Status   04/11/2019 21 7 - 30 mg/dL Final     Creatinine   Date Value Ref Range Status   04/11/2019 0.96 0.52 - 1.04 mg/dL Final     GFR Estimate   Date Value Ref Range Status   04/11/2019 56 (L) >60 mL/min/[1.73_m2] Final     Comment:     Non  GFR Calc  Starting 12/18/2018, serum creatinine based estimated GFR (eGFR) will be   calculated using the Chronic Kidney Disease Epidemiology Collaboration   (CKD-EPI) equation.       Calcium   Date Value Ref Range Status   04/11/2019 8.8 8.5 - 10.1 mg/dL Final     Anemia screen:    CBC RESULTS:   Recent Labs   Lab Test 04/11/19  1021   WBC 6.9   RBC 4.16   HGB 13.7   HCT 41.3   MCV 99   MCH 32.9   MCHC 33.2   RDW 14.6        SH:    Marital status:  - lives in [place]  Kids: 3 daughters  Employment: retired, PCA on weekends, volunteering  Exercise: walks her dog  Tobacco: no  Etoh: no  Recreational drugs: no  Caffeine: no    Exam:    There were no vitals taken for this visit.    Gen: sounded healthy on the phone.    Assessment and Plan - Decision Making    1. Dyslipidemia    Per HPI    - simvastatin (ZOCOR) 10 MG tablet; TAKE 1 TABLET EVERY DAY  Dispense: 90 tablet; Refill: 1    2. History of CVA (cerebrovascular accident)    Per HPI    - clopidogrel (PLAVIX) 75 MG tablet; Take 1 tablet (75 mg) by mouth daily  Dispense: 90 tablet; Refill: 1      Written instructions given as follows:    Patient Instructions   See you in 6 months for a physical with fasting labs.    Total time on the phone and reviewing records: 12 min

## 2020-12-15 DIAGNOSIS — Z86.73 HISTORY OF CVA (CEREBROVASCULAR ACCIDENT): ICD-10-CM

## 2020-12-15 DIAGNOSIS — E78.5 DYSLIPIDEMIA: ICD-10-CM

## 2020-12-17 RX ORDER — SIMVASTATIN 10 MG
TABLET ORAL
Qty: 90 TABLET | Refills: 0 | Status: SHIPPED | OUTPATIENT
Start: 2020-12-17 | End: 2021-01-27

## 2020-12-17 RX ORDER — CLOPIDOGREL BISULFATE 75 MG/1
TABLET ORAL
Qty: 90 TABLET | Refills: 0 | Status: SHIPPED | OUTPATIENT
Start: 2020-12-17 | End: 2021-01-27

## 2020-12-17 NOTE — TELEPHONE ENCOUNTER
"Routing refill request to provider for review/approval because:  Requested Prescriptions   Pending Prescriptions Disp Refills    simvastatin (ZOCOR) 10 MG tablet [Pharmacy Med Name: SIMVASTATIN 10 MG Tablet] 90 tablet 1     Sig: TAKE 1 TABLET EVERY DAY       Statins Protocol Failed - 12/15/2020  9:45 AM        Failed - LDL on file in past 12 months     Recent Labs   Lab Test 04/11/19  1021   LDL 68             Passed - No abnormal creatine kinase in past 12 months     No lab results found.             Passed - Recent (12 mo) or future (30 days) visit within the authorizing provider's specialty     Patient has had an office visit with the authorizing provider or a provider within the authorizing providers department within the previous 12 mos or has a future within next 30 days. See \"Patient Info\" tab in inbasket, or \"Choose Columns\" in Meds & Orders section of the refill encounter.              Passed - Medication is active on med list        Passed - Patient is age 18 or older        Passed - No active pregnancy on record        Passed - No positive pregnancy test in past 12 months          clopidogrel (PLAVIX) 75 MG tablet [Pharmacy Med Name: CLOPIDOGREL 75 MG Tablet] 90 tablet 1     Sig: TAKE 1 TABLET EVERY DAY       Plavix Failed - 12/15/2020  9:45 AM        Failed - Normal HGB on file in past 12 months     Recent Labs   Lab Test 04/11/19  1021   HGB 13.7               Failed - Normal Platelets on file in past 12 months     Recent Labs   Lab Test 04/11/19  1021                  Passed - No active PPI on record unless is Protonix        Passed - Recent (12 mo) or future (30 days) visit within the authorizing provider's specialty     Patient has had an office visit with the authorizing provider or a provider within the authorizing providers department within the previous 12 mos or has a future within next 30 days. See \"Patient Info\" tab in inbasket, or \"Choose Columns\" in Meds & Orders section of the refill " encounter.              Passed - Medication is active on med list        Passed - Patient is age 18 or older        Passed - No active pregnancy on record        Passed - No positive pregnancy test in past 12 months               Sophy SALAZARN, RN, CPN

## 2020-12-23 ENCOUNTER — TRANSFERRED RECORDS (OUTPATIENT)
Dept: HEALTH INFORMATION MANAGEMENT | Facility: CLINIC | Age: 80
End: 2020-12-23

## 2020-12-23 LAB
ALT SERPL-CCNC: 27 U/L (ref 0–78)
AST SERPL-CCNC: 21 U/L (ref 0–37)

## 2021-01-27 ENCOUNTER — OFFICE VISIT (OUTPATIENT)
Dept: FAMILY MEDICINE | Facility: CLINIC | Age: 81
End: 2021-01-27
Payer: COMMERCIAL

## 2021-01-27 VITALS
HEIGHT: 66 IN | DIASTOLIC BLOOD PRESSURE: 70 MMHG | SYSTOLIC BLOOD PRESSURE: 135 MMHG | HEART RATE: 75 BPM | OXYGEN SATURATION: 97 % | WEIGHT: 145 LBS | BODY MASS INDEX: 23.3 KG/M2 | TEMPERATURE: 98 F

## 2021-01-27 DIAGNOSIS — Z86.73 HISTORY OF CVA (CEREBROVASCULAR ACCIDENT): ICD-10-CM

## 2021-01-27 DIAGNOSIS — E78.5 DYSLIPIDEMIA: ICD-10-CM

## 2021-01-27 DIAGNOSIS — Z00.00 ENCOUNTER FOR MEDICARE ANNUAL WELLNESS EXAM: Primary | ICD-10-CM

## 2021-01-27 LAB
ANION GAP SERPL CALCULATED.3IONS-SCNC: 5 MMOL/L (ref 3–14)
BUN SERPL-MCNC: 23 MG/DL (ref 7–30)
CALCIUM SERPL-MCNC: 9 MG/DL (ref 8.5–10.1)
CHLORIDE SERPL-SCNC: 109 MMOL/L (ref 94–109)
CHOLEST SERPL-MCNC: 149 MG/DL
CO2 SERPL-SCNC: 28 MMOL/L (ref 20–32)
CREAT SERPL-MCNC: 0.97 MG/DL (ref 0.52–1.04)
GFR SERPL CREATININE-BSD FRML MDRD: 55 ML/MIN/{1.73_M2}
GLUCOSE SERPL-MCNC: 82 MG/DL (ref 70–99)
HDLC SERPL-MCNC: 57 MG/DL
LDLC SERPL CALC-MCNC: 73 MG/DL
NONHDLC SERPL-MCNC: 92 MG/DL
POTASSIUM SERPL-SCNC: 4 MMOL/L (ref 3.4–5.3)
SODIUM SERPL-SCNC: 142 MMOL/L (ref 133–144)
TRIGL SERPL-MCNC: 97 MG/DL

## 2021-01-27 PROCEDURE — 99397 PER PM REEVAL EST PAT 65+ YR: CPT | Performed by: FAMILY MEDICINE

## 2021-01-27 PROCEDURE — 80048 BASIC METABOLIC PNL TOTAL CA: CPT | Performed by: FAMILY MEDICINE

## 2021-01-27 PROCEDURE — 80061 LIPID PANEL: CPT | Performed by: FAMILY MEDICINE

## 2021-01-27 PROCEDURE — 36415 COLL VENOUS BLD VENIPUNCTURE: CPT | Performed by: FAMILY MEDICINE

## 2021-01-27 RX ORDER — CLOPIDOGREL BISULFATE 75 MG/1
TABLET ORAL
Qty: 90 TABLET | Refills: 3 | Status: SHIPPED | OUTPATIENT
Start: 2021-01-27 | End: 2021-04-19

## 2021-01-27 RX ORDER — SIMVASTATIN 10 MG
TABLET ORAL
Qty: 90 TABLET | Refills: 3 | Status: SHIPPED | OUTPATIENT
Start: 2021-01-27 | End: 2021-04-19

## 2021-01-27 ASSESSMENT — MIFFLIN-ST. JEOR: SCORE: 1136.53

## 2021-01-27 NOTE — LETTER
January 29, 2021      Bria Long  [address] [place] [place] [address]        Dear ,    We are writing to inform you of your test results.    Your test results fall within the expected range(s) or remain unchanged from previous results.  Please continue with current treatment plan.    Resulted Orders   Basic metabolic panel   Result Value Ref Range    Sodium 142 133 - 144 mmol/L    Potassium 4.0 3.4 - 5.3 mmol/L    Chloride 109 94 - 109 mmol/L    Carbon Dioxide 28 20 - 32 mmol/L    Anion Gap 5 3 - 14 mmol/L    Glucose 82 70 - 99 mg/dL      Comment:      Fasting specimen    Urea Nitrogen 23 7 - 30 mg/dL    Creatinine 0.97 0.52 - 1.04 mg/dL    GFR Estimate 55 (L) >60 mL/min/[1.73_m2]      Comment:      Non  GFR Calc  Starting 12/18/2018, serum creatinine based estimated GFR (eGFR) will be   calculated using the Chronic Kidney Disease Epidemiology Collaboration   (CKD-EPI) equation.      GFR Estimate If Black 63 >60 mL/min/[1.73_m2]      Comment:       GFR Calc  Starting 12/18/2018, serum creatinine based estimated GFR (eGFR) will be   calculated using the Chronic Kidney Disease Epidemiology Collaboration   (CKD-EPI) equation.      Calcium 9.0 8.5 - 10.1 mg/dL   Lipid panel reflex to direct LDL Fasting   Result Value Ref Range    Cholesterol 149 <200 mg/dL    Triglycerides 97 <150 mg/dL      Comment:      Fasting specimen    HDL Cholesterol 57 >49 mg/dL    LDL Cholesterol Calculated 73 <100 mg/dL      Comment:      Desirable:       <100 mg/dl    Non HDL Cholesterol 92 <130 mg/dL       If you have any questions or concerns, please call the clinic at the number listed above.       Sincerely,      Stevo Penaloza MD/daniel

## 2021-01-27 NOTE — PROGRESS NOTES
"  SUBJECTIVE:   Bria Long is a 80 year old female who presents for Preventive Visit.    Patient has been advised of split billing requirements and indicates understanding: Yes  Are you in the first 12 months of your Medicare Part B coverage?  No    Physical Health:    In general, how would you rate your overall physical health? good    Outside of work, how many days during the week do you exercise? 4-5 days/week    Outside of work, approximately how many minutes a day do you exercise?15-30 minutes    If you drink alcohol do you typically have >3 drinks per day or >7 drinks per week? No    Do you usually eat at least 4 servings of fruit and vegetables a day, include whole grains & fiber and avoid regularly eating high fat or \"junk\" foods? Yes    Do you have any problems taking medications regularly?  No    Do you have any side effects from medications? none    Needs assistance for the following daily activities: no assistance needed    Which of the following safety concerns are present in your home?  none identified     Hearing impairment: Yes,     In the past 6 months, have you been bothered by leaking of urine? no    Mental Health:    In general, how would you rate your overall mental or emotional health? good  PHQ-2 Score:      Do you feel safe in your environment? Yes    Have you ever done Advance Care Planning? (For example, a Health Directive, POLST, or a discussion with a medical provider or your loved ones about your wishes): No, advance care planning information given to patient to review.  Patient declined advance care planning discussion at this time.    Additional concerns to address?  No    Fall risk:       Cognitive Screenin) Repeat 3 items (Leader, Season, Table)    2) Clock draw: NORMAL  3) 3 item recall: Recalls 3 objects  Results: 3 items recalled: COGNITIVE IMPAIRMENT LESS LIKELY    Mini-CogTM Copyright S Erick. Licensed by the author for use in [facility]; " reprinted with permission (daina@.[email or web address]). All rights reserved.      Dry mouth - she is bothered by this. She denies dry eyes.   Evaluation and treatment:    We went through diff dx including medication side effects vs Sjogren's.   Biotene and water helps.   If it gets worse, she can let me know - we can have her see specialist.    BPV - she has baseline hearing loss and she wears right hearing aide. Lately she has movement/spinnin sensation off and on. This is not frequent and lasts only a few seconds. It seems to happen when she moves her head quickly. She denies syncope, near syncope, chest pain, shortness of breath or palpitations. She has baseline poor balance owing to history of CVA.  Evaluation and treatment:    Since the symptoms are mild, we agreed on observation.   She will report worsening symptoms.    Crohn's - she has cramping and diarrhea off and on.  Evaluation and treatment:    Follows with [facility].   Takes Imodium and Mercaptopurine.    CVA, ASD (s/p percutaneous closure) - the only neurological issue she has is poor balance.  Evaluation and treatment:    Percutaneous ASD closure on 1/15/08.   Plavix 75 mg daily.   Continue same tx.    Dyslipidemia - has history of CVA.   Evaluation and treatment:   Per ATP, no statin recommended.   Zocor 10 mg daily - no side effects.   Continue same tx.   Counseling done today regarding healthy weight, diet and exercise.     Recent Labs   Lab Test 01/27/21  1217 04/11/19  1021   CHOL 149 142   HDL 57 52   LDL 73 68   TRIG 97 111       Lab Results   Component Value Date    ALT 27 04/11/2019     Preventive - Declines flu shot today, citing she does not get these at all.    Immunization History   Administered Date(s) Administered     Pneumo Conj 13-V (2010&after) 05/11/2015     Pneumococcal 23 valent 05/16/2005     TD (ADULT, 7+) 05/27/2009     TDAP Vaccine (Adacel) 05/11/2015     Zoster vaccine recombinant adjuvanted (SHINGRIX) 04/18/2019, 07/03/2019     Zoster vaccine,  live 08/03/2011     -Mammogram: negative 5/21/20 - declines further mammograms.    -PAP: n/a due to age    -DEXA: negative 4/19/19    - Colon CA screen: per GI due to crohn's.    - Advanced directive: declines, citing her daughters know what she wants.    Diabetes screen:     Last Comprehensive Metabolic Panel:  Sodium   Date Value Ref Range Status   01/27/2021 142 133 - 144 mmol/L Final     Potassium   Date Value Ref Range Status   01/27/2021 4.0 3.4 - 5.3 mmol/L Final     Chloride   Date Value Ref Range Status   01/27/2021 109 94 - 109 mmol/L Final     Carbon Dioxide   Date Value Ref Range Status   01/27/2021 28 20 - 32 mmol/L Final     Anion Gap   Date Value Ref Range Status   01/27/2021 5 3 - 14 mmol/L Final     Glucose   Date Value Ref Range Status   01/27/2021 82 70 - 99 mg/dL Final     Comment:     Fasting specimen     Urea Nitrogen   Date Value Ref Range Status   01/27/2021 23 7 - 30 mg/dL Final     Creatinine   Date Value Ref Range Status   01/27/2021 0.97 0.52 - 1.04 mg/dL Final     GFR Estimate   Date Value Ref Range Status   01/27/2021 55 (L) >60 mL/min/[1.73_m2] Final     Comment:     Non  GFR Calc  Starting 12/18/2018, serum creatinine based estimated GFR (eGFR) will be   calculated using the Chronic Kidney Disease Epidemiology Collaboration   (CKD-EPI) equation.       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  Exercise: walks her dog about 20 min about 4 times per week  Tobacco: no  Etoh: no  Recreational drugs: no  Caffeine: no    Reviewed and updated as needed this visit by clinical staff                 Reviewed and updated as needed  this visit by Provider                Social History     Tobacco Use     Smoking status: Former Smoker     Quit date: 1985     Years since quittin.5     Smokeless tobacco: Never Used   Substance Use Topics     Alcohol use: No     Frequency: Never                           Current providers sharing in care for this patient include:   Patient Care Team:  Stevo Penaloza MD as PCP - General (Family Practice)  Stevo Penaloza MD as Assigned PCP    The following health maintenance items are reviewed in Epic and correct as of today:  Health Maintenance   Topic Date Due     MEDICARE ANNUAL WELLNESS VISIT  2020     PHQ-2  2021     FALL RISK ASSESSMENT  2021     LIPID  2024     ADVANCE CARE PLANNING  2024     DTAP/TDAP/TD IMMUNIZATION (3 - Td) 2025     DEXA  2034     INFLUENZA VACCINE  Completed     Pneumococcal Vaccine: 65+ Years  Completed     ZOSTER IMMUNIZATION  Completed     Pneumococcal Vaccine: Pediatrics (0 to 5 Years) and At-Risk Patients (6 to 64 Years)  Aged Out     IPV IMMUNIZATION  Aged Out     MENINGITIS IMMUNIZATION  Aged Out     HEPATITIS B IMMUNIZATION  Aged Out           ROS:  CONSTITUTIONAL: NEGATIVE for fever, chills, change in weight  INTEGUMENTARY/SKIN: NEGATIVE for worrisome rashes, moles or lesions  EYES: NEGATIVE for vision changes or irritation  ENT/MOUTH: NEGATIVE for ear, mouth and throat problems  RESP: NEGATIVE for significant cough or SOB  BREAST: NEGATIVE for masses, tenderness or discharge  CV: NEGATIVE for chest pain, palpitations or peripheral edema  GI: NEGATIVE for nausea, abdominal pain, heartburn, or change in bowel habits  : NEGATIVE for frequency, dysuria, or hematuria  MUSCULOSKELETAL: NEGATIVE for significant arthralgias or myalgia  NEURO: NEGATIVE for weakness, dizziness or paresthesias  ENDOCRINE: NEGATIVE for temperature intolerance, skin/hair changes  HEME: NEGATIVE for bleeding problems  PSYCHIATRIC: NEGATIVE for changes in  "mood or affect    OBJECTIVE:   /70   Pulse 75   Temp 98  F (36.7  C) (Tympanic)   Ht 1.664 m (5' 5.5\")   Wt 65.8 kg (145 lb)   SpO2 97%   BMI 23.76 kg/m   Estimated body mass index is 23.76 kg/m  as calculated from the following:    Height as of this encounter: 1.664 m (5' 5.5\").    Weight as of this encounter: 65.8 kg (145 lb).  EXAM:   GENERAL: healthy, alert and no distress  EYES: Eyes grossly normal to inspection, PERRL and conjunctivae and sclerae normal  HENT: ear canals and TM's normal, nose and mouth without ulcers or lesions  NECK: no adenopathy, no asymmetry, masses, or scars and thyroid normal to palpation  RESP: lungs clear to auscultation - no rales, rhonchi or wheezes  BREAST: normal without masses, tenderness or nipple discharge and no palpable axillary masses or adenopathy  CV: regular rate and rhythm, normal S1 S2, no S3 or S4, no murmur, click or rub, no peripheral edema and peripheral pulses strong  ABDOMEN: soft, nontender, no hepatosplenomegaly, no masses and bowel sounds normal  MS: no gross musculoskeletal defects noted, no edema  SKIN: no suspicious lesions or rashes  NEURO: Normal strength and tone, mentation intact and speech normal  PSYCH: mentation appears normal, affect normal/bright      ASSESSMENT / PLAN:       COUNSELING:  Reviewed preventive health counseling, as reflected in patient instructions       Regular exercise       Healthy diet/nutrition    Estimated body mass index is 24.72 kg/m  as calculated from the following:    Height as of 4/11/19: 1.626 m (5' 4\").    Weight as of 8/16/19: 65.3 kg (144 lb).        She reports that she quit smoking about 35 years ago. She has never used smokeless tobacco.    Appropriate preventive services were discussed with this patient, including applicable screening as appropriate for cardiovascular disease, diabetes, osteopenia/osteoporosis, and glaucoma.  As appropriate for age/gender, discussed screening for colorectal cancer, " prostate cancer, breast cancer, and cervical cancer. Checklist reviewing preventive services available has been given to the patient.    Reviewed patients plan of care and provided an AVS. The Basic Care Plan (routine screening as documented in Health Maintenance) for Bria meets the Care Plan requirement. This Care Plan has been established and reviewed with the Patient.    Assessment and Plan - Decision Making    1. Encounter for Medicare annual wellness exam    Per HPI    - Basic metabolic panel  - Lipid panel reflex to direct LDL Fasting    2. History of CVA (cerebrovascular accident)    Per HPI    - clopidogrel (PLAVIX) 75 MG tablet; TAKE 1 TABLET EVERY DAY  Dispense: 90 tablet; Refill: 3    3. Dyslipidemia    Per HPI    - simvastatin (ZOCOR) 10 MG tablet; TAKE 1 TABLET EVERY DAY  Dispense: 90 tablet; Refill: 3      Written instructions given as follows:    Patient Instructions   1. I recommend including veggies, fresh fruits (3 to 5 servings), nuts (unsalted) in your daily diet. Cut down on carbs like bread, pasta, rice, potatoes. Cut down on red meats like burgers etc. Increase healthy proteins like beans, tofu, tuna, chicken and turkey.    2. Keep up walking with your dog.      3. Do self breast exams monthly. Report any lumps.    4. Avoid the sun or otherwise use sun screen.    5. See the dentist and eye doctor regularly.     6. If all is well, see you in one year for a physical with fasting labs.

## 2021-01-27 NOTE — PATIENT INSTRUCTIONS
1. I recommend including veggies, fresh fruits (3 to 5 servings), nuts (unsalted) in your daily diet. Cut down on carbs like bread, pasta, rice, potatoes. Cut down on red meats like burgers etc. Increase healthy proteins like beans, tofu, tuna, chicken and turkey.    2. Keep up walking with your dog.      3. Do self breast exams monthly. Report any lumps.    4. Avoid the sun or otherwise use sun screen.    5. See the dentist and eye doctor regularly.     6. If all is well, see you in one year for a physical with fasting labs.

## 2021-03-10 ENCOUNTER — IMMUNIZATION (OUTPATIENT)
Dept: NURSING | Facility: CLINIC | Age: 81
End: 2021-03-10
Payer: COMMERCIAL

## 2021-03-10 PROCEDURE — 91300 PR COVID VAC PFIZER DIL RECON 30 MCG/0.3 ML IM: CPT

## 2021-03-10 PROCEDURE — 0001A PR COVID VAC PFIZER DIL RECON 30 MCG/0.3 ML IM: CPT

## 2021-03-24 ENCOUNTER — TRANSFERRED RECORDS (OUTPATIENT)
Dept: HEALTH INFORMATION MANAGEMENT | Facility: CLINIC | Age: 81
End: 2021-03-24

## 2021-03-24 ENCOUNTER — TELEPHONE (OUTPATIENT)
Dept: FAMILY MEDICINE | Facility: CLINIC | Age: 81
End: 2021-03-24

## 2021-03-24 NOTE — TELEPHONE ENCOUNTER
Patient seen today and did a quantaflow test  Her lf foot normal  Rt foot had a moderate decrease    Wanted you let you know    Call [phone] if questions  She will fax you the report also

## 2021-03-31 ENCOUNTER — IMMUNIZATION (OUTPATIENT)
Dept: NURSING | Facility: CLINIC | Age: 81
End: 2021-03-31
Attending: FAMILY MEDICINE
Payer: COMMERCIAL

## 2021-03-31 PROCEDURE — 91300 PR COVID VAC PFIZER DIL RECON 30 MCG/0.3 ML IM: CPT

## 2021-03-31 PROCEDURE — 0002A PR COVID VAC PFIZER DIL RECON 30 MCG/0.3 ML IM: CPT

## 2021-04-12 DIAGNOSIS — E78.5 DYSLIPIDEMIA: ICD-10-CM

## 2021-04-12 DIAGNOSIS — Z86.73 HISTORY OF CVA (CEREBROVASCULAR ACCIDENT): ICD-10-CM

## 2021-04-12 RX ORDER — MERCAPTOPURINE 50 MG/1
TABLET ORAL
Status: CANCELLED | OUTPATIENT
Start: 2021-04-12

## 2021-04-12 RX ORDER — LOPERAMIDE HYDROCHLORIDE 2 MG/1
TABLET ORAL 2 TIMES DAILY
Status: CANCELLED | OUTPATIENT
Start: 2021-04-12

## 2021-04-12 RX ORDER — CLOPIDOGREL BISULFATE 75 MG/1
TABLET ORAL
Qty: 90 TABLET | Refills: 3 | Status: CANCELLED | OUTPATIENT
Start: 2021-04-12

## 2021-04-12 RX ORDER — SIMVASTATIN 10 MG
TABLET ORAL
Qty: 90 TABLET | Refills: 3 | Status: CANCELLED | OUTPATIENT
Start: 2021-04-12

## 2021-04-13 NOTE — TELEPHONE ENCOUNTER
Routing refill request to provider for review/approval because:  Labs not current:  Hgb, platelet count  Drug not on the [facility] refill protocol  Mercaptopurine, imodium A-D    Lynn SALAZARN, RN

## 2021-04-19 RX ORDER — CLOPIDOGREL BISULFATE 75 MG/1
TABLET ORAL
Qty: 90 TABLET | Refills: 1 | Status: SHIPPED | OUTPATIENT
Start: 2021-04-19 | End: 2021-08-06

## 2021-04-19 RX ORDER — SIMVASTATIN 10 MG
TABLET ORAL
Qty: 90 TABLET | Refills: 1 | Status: SHIPPED | OUTPATIENT
Start: 2021-04-19 | End: 2021-08-06

## 2021-04-19 NOTE — TELEPHONE ENCOUNTER
New pharmacy for simvastatin and plavix.  Available prescription's sent to new pharmacy.    Pt advised simvastatin and plavix sent to [facility].  Advised her ot request the other medications from her GI provider, she will do that.  Lynn SALAZARN, RN

## 2021-04-19 NOTE — TELEPHONE ENCOUNTER
On 1/27/21, I refilled Zocor and Plavix for one year - can you check on that please.    The Imodium and Mercaptopurine comes from her GI specialist - please have her contact them.    Stevo Penaloza M.D.

## 2021-06-15 ENCOUNTER — TELEPHONE (OUTPATIENT)
Dept: FAMILY MEDICINE | Facility: CLINIC | Age: 81
End: 2021-06-15

## 2021-06-15 NOTE — TELEPHONE ENCOUNTER
Patient is requesting refills of the medication, mercaptopurine (PURINETHOL) 50 MG tablet CHEMO please.Patient states she has only 2 pills left.  Thank you,  Katia Cortez

## 2021-06-15 NOTE — TELEPHONE ENCOUNTER
Per message below requesting refill of the mercaptopurine.  This med is filled by [facility]; Esme Edwards.  I called patient.  She states that she had forgotten that Dr. Penaloza hadn't prescribed this medication.  She will call [facility] and ask them to refill the medication.  She has only 2 pills left.  I did give her the phone number for [facility].  She doesn't remember when her last appointment with her was.  States changed insurance but made sure when she did that her GI doctors were in her network.  No further concerns.  Martha Guerrero, RN

## 2021-07-29 ENCOUNTER — TRANSFERRED RECORDS (OUTPATIENT)
Dept: HEALTH INFORMATION MANAGEMENT | Facility: CLINIC | Age: 81
End: 2021-07-29

## 2021-07-29 LAB
ALT SERPL-CCNC: 31 U/L (ref 0–78)
AST SERPL-CCNC: 25 U/L (ref 0–37)

## 2021-08-06 ENCOUNTER — VIRTUAL VISIT (OUTPATIENT)
Dept: FAMILY MEDICINE | Facility: CLINIC | Age: 81
End: 2021-08-06
Payer: COMMERCIAL

## 2021-08-06 DIAGNOSIS — E78.5 DYSLIPIDEMIA: ICD-10-CM

## 2021-08-06 DIAGNOSIS — Z86.73 HISTORY OF CVA (CEREBROVASCULAR ACCIDENT): ICD-10-CM

## 2021-08-06 PROCEDURE — 99441 PR PHYSICIAN TELEPHONE EVALUATION 5-10 MIN: CPT | Performed by: FAMILY MEDICINE

## 2021-08-06 RX ORDER — CLOPIDOGREL BISULFATE 75 MG/1
TABLET ORAL
Qty: 90 TABLET | Refills: 1 | Status: SHIPPED | OUTPATIENT
Start: 2021-08-06 | End: 2021-11-10

## 2021-08-06 RX ORDER — SIMVASTATIN 10 MG
TABLET ORAL
Qty: 90 TABLET | Refills: 1 | Status: SHIPPED | OUTPATIENT
Start: 2021-08-06 | End: 2021-11-10

## 2021-08-06 NOTE — PROGRESS NOTES
Bria is a 81 year old who is being evaluated via a billable telephone visit.      What phone number would you like to be contacted at? [phone]  How would you like to obtain your AVS? MyChart      Please note: only the issues listed at the bottom under Assessment and Plan are addressed today. The rest of the medical problems are listed for the sake of completeness.    Dry mouth - she is bothered by this. She denies dry eyes.   Evaluation and treatment:    We went through diff dx including medication side effects vs Sjogren's.   Biotene and water helps.   If it gets worse, she can let me know - we can have her see specialist.    BPV - she has baseline hearing loss and she wears right hearing aide. Lately she has movement/spinnin sensation off and on. This is not frequent and lasts only a few seconds. It seems to happen when she moves her head quickly. She denies syncope, near syncope, chest pain, shortness of breath or palpitations. She has baseline poor balance owing to history of CVA.  Evaluation and treatment:    Since the symptoms are mild, we agreed on observation.   She will report worsening symptoms.    Crohn's - she has cramping and diarrhea off and on.  Evaluation and treatment:    Follows with [facility].   Takes Imodium and Mercaptopurine.    CVA, ASD (s/p percutaneous closure) - the only neurological issue she has is poor balance.  Evaluation and treatment:    Percutaneous ASD closure on 1/15/08.   Plavix 75 mg daily.   Continue same tx.    Dyslipidemia - has history of CVA.   Evaluation and treatment:   Per ATP, no statin recommended.   Zocor 10 mg daily - no side effects.   Continue same tx.   Counseling done today regarding healthy weight, diet and exercise.     Recent Labs   Lab Test 01/27/21  1217 04/11/19  1021   CHOL 149 142   HDL 57 52   LDL 73 68   TRIG 97 111       Lab Results   Component Value Date    ALT 27 04/11/2019     Preventive - Declines flu shot today, citing she does not get these  at all.    Immunization History   Administered Date(s) Administered     COVID-19,PF,Pfizer 03/10/2021, 03/31/2021     Pneumo Conj 13-V (2010&after) 05/11/2015     Pneumococcal 23 valent 05/16/2005     TD (ADULT, 7+) 05/27/2009     TDAP Vaccine (Adacel) 05/11/2015     Zoster vaccine recombinant adjuvanted (SHINGRIX) 04/18/2019, 07/03/2019     Zoster vaccine, live 08/03/2011     -Mammogram: negative 5/21/20 - declines further mammograms.    -PAP: n/a due to age    -DEXA: negative 4/19/19    - Colon CA screen: per GI due to crohn's.    - Advanced directive: declines, citing her daughters know what she wants.    Diabetes screen:     Last Comprehensive Metabolic Panel:  Sodium   Date Value Ref Range Status   01/27/2021 142 133 - 144 mmol/L Final     Potassium   Date Value Ref Range Status   01/27/2021 4.0 3.4 - 5.3 mmol/L Final     Chloride   Date Value Ref Range Status   01/27/2021 109 94 - 109 mmol/L Final     Carbon Dioxide   Date Value Ref Range Status   01/27/2021 28 20 - 32 mmol/L Final     Anion Gap   Date Value Ref Range Status   01/27/2021 5 3 - 14 mmol/L Final     Glucose   Date Value Ref Range Status   01/27/2021 82 70 - 99 mg/dL Final     Comment:     Fasting specimen     Urea Nitrogen   Date Value Ref Range Status   01/27/2021 23 7 - 30 mg/dL Final     Creatinine   Date Value Ref Range Status   01/27/2021 0.97 0.52 - 1.04 mg/dL Final     GFR Estimate   Date Value Ref Range Status   01/27/2021 55 (L) >60 mL/min/[1.73_m2] Final     Comment:     Non  GFR Calc  Starting 12/18/2018, serum creatinine based estimated GFR (eGFR) will be   calculated using the Chronic Kidney Disease Epidemiology Collaboration   (CKD-EPI) equation.       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  Exercise: walks her dog about 20 min about 4 times per week  Tobacco: no  Etoh: no  Recreational drugs: no  Caffeine: no    Reviewed and updated as needed this visit by clinical staff   Allergies  Meds              Reviewed and updated as needed this visit by Provider                Social History     Tobacco Use     Smoking status: Former Smoker     Quit date: 1985     Years since quittin.1     Smokeless tobacco: Never Used   Substance Use Topics     Alcohol use: No                           Current providers sharing in care for this patient include:   Patient Care Team:  Stevo Penaloza MD as PCP - General (Family Practice)  Stevo Penaloza MD as Assigned PCP    The following health maintenance items are reviewed in Epic and correct as of today:  Health Maintenance   Topic Date Due     ANNUAL REVIEW OF  ORDERS  Never done     INFLUENZA VACCINE (1) 2021     MEDICARE ANNUAL WELLNESS VISIT  2022     FALL RISK ASSESSMENT  2022     DTAP/TDAP/TD IMMUNIZATION (3 - Td or Tdap) 2025     ADVANCE CARE PLANNING  2026     DEXA  2034     PHQ-2  Completed     Pneumococcal Vaccine: 65+ Years  Completed     ZOSTER IMMUNIZATION  Completed     COVID-19 Vaccine  Completed     IPV IMMUNIZATION  Aged Out     MENINGITIS IMMUNIZATION  Aged Out     HEPATITIS B IMMUNIZATION  Aged Out       Exam:    There were no vitals taken for this visit.    Gen: on the phone, in no acute distress        Assessment and Plan - Decision Making    1. Dyslipidemia    Per HPI    - simvastatin (ZOCOR) 10 MG tablet; TAKE 1 TABLET EVERY DAY  Dispense: 90 tablet; Refill: 1    2. History of CVA (cerebrovascular accident)    Per HPI    - clopidogrel (PLAVIX) 75 MG tablet; TAKE 1 TABLET EVERY DAY  Dispense: 90 tablet; Refill: 1      Written instructions given as follows:    Patient  Instructions   See you Jan 2022 for a physical with fasting labs.    Total time on the phone and reviewing records: 6 min

## 2021-09-02 ENCOUNTER — NURSE TRIAGE (OUTPATIENT)
Dept: FAMILY MEDICINE | Facility: CLINIC | Age: 81
End: 2021-09-02

## 2021-09-02 DIAGNOSIS — D22.9 SKIN MOLE: Primary | ICD-10-CM

## 2021-09-02 NOTE — TELEPHONE ENCOUNTER
"Pt is calling requesting Dermatology referral due to a brown mole on her neck has become intermittently irritated when touched in recent days.      Pt states her daughter recently had a cancerous mole identified so she would like her own checked by Dermatology.  Pt states there is a border of redness surrounding the mole that is not painful at this time, but is intermittently.    Pt states she will call insurance now to verify where she has coverage to see Dermatology. Please advise if Primary Care visit is needed to obtain requested Derm referral.    Reason for Disposition    Patient wants to be seen     Pt wants to be seen by a Dermatologist    Additional Information    Negative: Patient sounds very sick or weak to the triager    Negative: Fever and bump is tender to touch    Negative: Looks infected (e.g., spreading redness, pus, red streak)    Negative: Looks like a boil, infected sore, or deep ulcer    Negative: Caller can't describe it clearly    Answer Assessment - Initial Assessment Questions  1. APPEARANCE of LESION: \"What does it look like?\"       Mole with redness around it on neck    2. SIZE: \"How big is it?\" (e.g., compare to size of pinhead, tip of pen, eraser, coin, pea, grape, ping pong ball; or size in cms or inches)       Pea-sized diameter, not height    3. COLOR: \"What color is it?\" \"Is there more than one color?\"      brown    4. SHAPE: \"What shape is it?\" (e.g., round, irregular)      Round    5. RAISED: \"Does it stick up above the skin or is it flat?\" (e.g., raised or elevated)      Raised somewhat    6. TENDER: \"Does it hurt when you touch it?\"  (Scale 1-10; or mild, moderate, severe)      No pain now, intermittently tender to touch     7. LOCATION: \"Where is it located?\"       Lt-side of neck    8. ONSET: \"When did it first appear?\"       Months ago noticed the mole, noticed intermittent irritation days ago    9. NUMBER: \"Is there just one?\" or \"Are there others?\"      1    10. CAUSE: \"What " "do you think it is?\"        Unknown, daughter had cancerous moles recently    11. OTHER SYMPTOMS: \"Do you have any other symptoms?\" (e.g., fever)        No fever    Protocols used: SKIN LESION - MOLES OR GROWTHS-A-OH      "

## 2021-09-06 NOTE — TELEPHONE ENCOUNTER
Let patient know most of these are benign and can be treated by freezing. I would suggest office visit - she can be seen by any available provider.    You can also add her to my schedule by video.    But in case she still wants to see dermatology, I placed the referral.    Stevo Penaloza M.D.

## 2021-09-07 NOTE — TELEPHONE ENCOUNTER
Called and left a message for the patient to call back.  If the patient call's back, please give her Dr. Penaloza's messge below.  Soraya YAP - Katia

## 2021-10-14 ENCOUNTER — TRANSFERRED RECORDS (OUTPATIENT)
Dept: HEALTH INFORMATION MANAGEMENT | Facility: CLINIC | Age: 81
End: 2021-10-14
Payer: COMMERCIAL

## 2021-10-14 LAB
ALT SERPL-CCNC: 22 IU/L (ref 0–32)
AST SERPL-CCNC: 22 IU/L (ref 0–40)

## 2021-11-08 ENCOUNTER — TRANSFERRED RECORDS (OUTPATIENT)
Dept: HEALTH INFORMATION MANAGEMENT | Facility: CLINIC | Age: 81
End: 2021-11-08
Payer: COMMERCIAL

## 2022-01-10 ENCOUNTER — TRANSFERRED RECORDS (OUTPATIENT)
Dept: HEALTH INFORMATION MANAGEMENT | Facility: CLINIC | Age: 82
End: 2022-01-10
Payer: COMMERCIAL

## 2022-01-10 LAB
ALT SERPL-CCNC: 17 IU/L (ref 0–32)
AST SERPL-CCNC: 19 IU/L (ref 0–40)
CREATININE (EXTERNAL): 0.97 MG/DL (ref 0.57–1)
GFR ESTIMATED (EXTERNAL): 55 ML/MIN/1.73M2
GFR ESTIMATED (IF AFRICAN AMERICAN) (EXTERNAL): 63 ML/MIN/1.73M2

## 2022-04-21 ENCOUNTER — TRANSFERRED RECORDS (OUTPATIENT)
Dept: HEALTH INFORMATION MANAGEMENT | Facility: CLINIC | Age: 82
End: 2022-04-21
Payer: COMMERCIAL

## 2022-04-21 LAB
ALT SERPL-CCNC: 15 IU/L (ref 0–32)
AST SERPL-CCNC: 20 IU/L (ref 0–40)

## 2022-04-26 DIAGNOSIS — E78.5 DYSLIPIDEMIA: ICD-10-CM

## 2022-04-26 DIAGNOSIS — Z86.73 HISTORY OF CVA (CEREBROVASCULAR ACCIDENT): ICD-10-CM

## 2022-04-26 NOTE — LETTER
April 27, 2022    Bria Long  [id number] [facility] [facility] [address]    Dear Bria,       We recently received a refill request for clopidogrel  And simvastatin.  We have refilled this for a one time 90 day supply only because you are due for a:    Medication check office visit and fasting lab appointment-per Dr Jett, this is needed this summer.      Please schedule this lab appointment 4-5 days prior to the office visit.     Please call at your earliest convenience so that there will not be a delay with your future refills.          Thank you,   Your [facility] Team/  [phone]

## 2022-04-27 RX ORDER — CLOPIDOGREL BISULFATE 75 MG/1
TABLET ORAL
Qty: 90 TABLET | Refills: 3 | Status: SHIPPED | OUTPATIENT
Start: 2022-04-27 | End: 2023-01-16

## 2022-04-27 RX ORDER — SIMVASTATIN 10 MG
TABLET ORAL
Qty: 90 TABLET | Refills: 0 | Status: SHIPPED | OUTPATIENT
Start: 2022-04-27 | End: 2023-02-01

## 2022-04-27 NOTE — TELEPHONE ENCOUNTER
"Routing refill request to provider for review/approval because:  Requested Prescriptions   Pending Prescriptions Disp Refills    clopidogrel (PLAVIX) 75 MG tablet [Pharmacy Med Name: Clopidogrel Bisulfate 75 MG Oral Tablet] 90 tablet 3     Sig: TAKE 1 TABLET BY MOUTH  DAILY        Plavix Failed - 4/26/2022 10:40 PM        Failed - Normal HGB on file in past 12 months     Recent Labs   Lab Test 04/11/19  1021   HGB 13.7                 Failed - Normal Platelets on file in past 12 months       Recent Labs   Lab Test 04/11/19  1021                  Passed - No active PPI on record unless is Protonix        Passed - Recent (12 mo) or future (30 days) visit within the authorizing provider's specialty     Patient has had an office visit with the authorizing provider or a provider within the authorizing providers department within the previous 12 mos or has a future within next 30 days. See \"Patient Info\" tab in in[employer], or \"Choose Columns\" in Meds & Orders section of the refill encounter.              Passed - Medication is active on med list        Passed - Patient is age 18 or older        Passed - No active pregnancy on record        Passed - No positive pregnancy test in past 12 months           simvastatin (ZOCOR) 10 MG tablet [Pharmacy Med Name: Simvastatin 10 MG Oral Tablet] 90 tablet 3     Sig: TAKE 1 TABLET BY MOUTH  DAILY        Statins Protocol Failed - 4/26/2022 10:40 PM        Failed - LDL on file in past 12 months     Recent Labs   Lab Test 01/27/21  1217   LDL 73               Passed - No abnormal creatine kinase in past 12 months     No lab results found.             Passed - Recent (12 mo) or future (30 days) visit within the authorizing provider's specialty     Patient has had an office visit with the authorizing provider or a provider within the authorizing providers department within the previous 12 mos or has a future within next 30 days. See \"Patient Info\" tab in inbasket, or \"Choose Columns\" " in Meds & Orders section of the refill encounter.              Passed - Medication is active on med list        Passed - Patient is age 18 or older        Passed - No active pregnancy on record        Passed - No positive pregnancy test in past 12 months                Sophy SALAZARN, RN

## 2022-06-27 ENCOUNTER — TRANSFERRED RECORDS (OUTPATIENT)
Dept: HEALTH INFORMATION MANAGEMENT | Facility: CLINIC | Age: 82
End: 2022-06-27

## 2022-06-27 LAB
ALT SERPL-CCNC: 15 IU/L (ref 0–32)
AST SERPL-CCNC: 18 IU/L (ref 0–40)

## 2022-07-07 ENCOUNTER — TELEPHONE (OUTPATIENT)
Dept: FAMILY MEDICINE | Facility: CLINIC | Age: 82
End: 2022-07-07

## 2022-07-07 NOTE — TELEPHONE ENCOUNTER
Called patient to change appointment to virtual, no answer and Voicemail is not set up  Patient can keep same appointment time with [facility],(7/7/22 @ 9:30) just needs to change to Virtual instead. If that does not work and prefers in clinic- she will need to reschedule   Nu DEL TORO  Patient Representative  [phone]

## 2022-07-26 ENCOUNTER — OFFICE VISIT (OUTPATIENT)
Dept: URGENT CARE | Facility: URGENT CARE | Age: 82
End: 2022-07-26
Payer: COMMERCIAL

## 2022-07-26 ENCOUNTER — ANCILLARY PROCEDURE (OUTPATIENT)
Dept: GENERAL RADIOLOGY | Facility: CLINIC | Age: 82
End: 2022-07-26
Attending: PHYSICIAN ASSISTANT
Payer: COMMERCIAL

## 2022-07-26 VITALS
TEMPERATURE: 98.2 F | DIASTOLIC BLOOD PRESSURE: 70 MMHG | BODY MASS INDEX: 22.39 KG/M2 | HEART RATE: 89 BPM | WEIGHT: 136.6 LBS | RESPIRATION RATE: 22 BRPM | OXYGEN SATURATION: 96 % | SYSTOLIC BLOOD PRESSURE: 127 MMHG

## 2022-07-26 DIAGNOSIS — M54.41 ACUTE BILATERAL LOW BACK PAIN WITH RIGHT-SIDED SCIATICA: Primary | ICD-10-CM

## 2022-07-26 DIAGNOSIS — K50.90 CROHN'S DISEASE WITHOUT COMPLICATION, UNSPECIFIED GASTROINTESTINAL TRACT LOCATION (H): ICD-10-CM

## 2022-07-26 DIAGNOSIS — M54.41 ACUTE BILATERAL LOW BACK PAIN WITH RIGHT-SIDED SCIATICA: ICD-10-CM

## 2022-07-26 PROCEDURE — 99213 OFFICE O/P EST LOW 20 MIN: CPT | Performed by: PHYSICIAN ASSISTANT

## 2022-07-26 PROCEDURE — 72100 X-RAY EXAM L-S SPINE 2/3 VWS: CPT | Mod: TC | Performed by: RADIOLOGY

## 2022-07-26 ASSESSMENT — ENCOUNTER SYMPTOMS
MYALGIAS: 0
SHORTNESS OF BREATH: 0
EYES NEGATIVE: 1
WEAKNESS: 0
ALLERGIC/IMMUNOLOGIC NEGATIVE: 1
NECK PAIN: 0
DIARRHEA: 0
ENDOCRINE NEGATIVE: 1
LIGHT-HEADEDNESS: 0
VOMITING: 0
HEADACHES: 0
FEVER: 0
ARTHRALGIAS: 0
PALPITATIONS: 0
CHILLS: 0
SORE THROAT: 0
RESPIRATORY NEGATIVE: 1
CARDIOVASCULAR NEGATIVE: 1
WOUND: 0
COUGH: 0
NECK STIFFNESS: 0
BACK PAIN: 1
NAUSEA: 0
RHINORRHEA: 0
DIZZINESS: 0
JOINT SWELLING: 0

## 2022-07-26 NOTE — PROGRESS NOTES
Chief Complaint:     Chief Complaint   Patient presents with     Back Pain     Across all of lower back and down right leg. Started last week. No known injury.       Medical Decision Making:    Differential Diagnosis:  Low back strain, muscle spasm, herniated disc, fracture     Christi Fraser was seen today for back pain.    Diagnoses and all orders for this visit:    Acute bilateral low back pain with right-sided sciatica  -     XR Lumbar Spine 2/3 Views; Future    Crohn's disease without complication, unspecified gastrointestinal tract location (H)         Plan    XR of the lumbar spine was negative for any acute fracture per my read.    Ice the area.   Stretching exercises.  Tylenol for pain.  Patient can not take Ibuprofen with Crohn's disease.  Chiropractic may help.  Patient declined flexeril and PT follow up.  Worrisome symptoms discussed with instructions to go to the ED.  Patient will follow up with PCP if symptoms have not resolved in 2 weeks.  Patient verbalized understanding and agreed with this plan.       HPI: Bria Long82 year old female presents with a 1 week history of back pain.  She does not recall any acute injury. Sitting makes the pain worse.  Standing and walking makes the pain better.  Since then it has unchanged. The patient does not have a past history of back problems.  The pain is localized to bilateral lumbar region.      There is no history of disturbance of bowel or bladder dysfunction associated with this present problem.      There is associated sciatica in the legs. There is no paresthesia in the legs. The patient does not have a history of weakness in the legs.    ROS:      Review of Systems   Constitutional: Negative for chills and fever.   HENT: Negative for congestion, ear pain, rhinorrhea and sore throat.    Eyes: Negative.    Respiratory: Negative.  Negative for cough and shortness of breath.    Cardiovascular: Negative.  Negative for chest pain and  palpitations.   Gastrointestinal: Negative for diarrhea, nausea and vomiting.   Endocrine: Negative.    Genitourinary: Negative.    Musculoskeletal: Positive for back pain. Negative for arthralgias, joint swelling, myalgias, neck pain and neck stiffness.   Skin: Negative.  Negative for rash and wound.   Allergic/Immunologic: Negative.  Negative for immunocompromised state.   Neurological: Negative for dizziness, weakness, light-headedness and headaches.        Family History   No family history on file.     Problem history  Patient Active Problem List   Diagnosis     Gait disturbance     History of CVA (cerebrovascular accident)     Hx of catheter-based closure of atrial septal defect     Crohn's disease without complication, unspecified gastrointestinal tract location (H)     Benign paroxysmal positional vertigo, unspecified laterality     Dry mouth        Allergies  No Known Allergies     Social History  Social History     Socioeconomic History     Marital status: Single     Spouse name: Not on file     Number of children: Not on file     Years of education: Not on file     Highest education level: Not on file   Occupational History     Not on file   Tobacco Use     Smoking status: Former Smoker     Quit date: 1985     Years since quittin.0     Smokeless tobacco: Never Used   Substance and Sexual Activity     Alcohol use: No     Drug use: No     Sexual activity: Not Currently   Other Topics Concern     Not on file   Social History Narrative     Not on file     Social Determinants of Health     Financial Resource Strain: Not on file   Food Insecurity: Not on file   Transportation Needs: Not on file   Physical Activity: Not on file   Stress: Not on file   Social Connections: Not on file   Intimate Partner Violence: Not on file   Housing Stability: Not on file        Current Meds    Current Outpatient Medications:      CALCIUM CITRATE-VITAMIN D3 PO, 600 mg daily , Disp: , Rfl:      clopidogrel (PLAVIX) 75 MG  tablet, TAKE 1 TABLET BY MOUTH  DAILY, Disp: 90 tablet, Rfl: 3     loperamide (IMODIUM A-D) 2 MG tablet, Take by mouth 2 times daily , Disp: , Rfl:      magnesium 250 MG tablet, Take 1 tablet by mouth daily, Disp: , Rfl:      mercaptopurine (PURINETHOL) 50 MG tablet CHEMO, take (1.5MG/KG)  by ORAL route  every day, Disp: , Rfl:      Multiple Vitamins-Minerals (MULTI COMPLETE PO), , Disp: , Rfl:      OMEGA-3 FISH OIL 1000 MG capsule, , Disp: , Rfl:      simvastatin (ZOCOR) 10 MG tablet, TAKE 1 TABLET BY MOUTH  DAILY, Disp: 90 tablet, Rfl: 0        Physical Exam:     Vital signs reviewed by Fredy Garcia PA-C  /70   Pulse 89   Temp 98.2  F (36.8  C) (Tympanic)   Resp 22   Wt 62 kg (136 lb 9.6 oz)   SpO2 96%   BMI 22.39 kg/m       PEFR:    Physical Exam  Vitals and nursing note reviewed.   Constitutional:       General: She is not in acute distress.     Appearance: She is well-developed. She is not ill-appearing, toxic-appearing or diaphoretic.   HENT:      Head: Normocephalic and atraumatic.      Right Ear: Tympanic membrane and external ear normal. No drainage, swelling or tenderness. Tympanic membrane is not perforated, erythematous, retracted or bulging.      Left Ear: Tympanic membrane and external ear normal. No drainage, swelling or tenderness. Tympanic membrane is not perforated, erythematous, retracted or bulging.      Nose: No mucosal edema, congestion or rhinorrhea.      Right Sinus: No maxillary sinus tenderness or frontal sinus tenderness.      Left Sinus: No maxillary sinus tenderness or frontal sinus tenderness.      Mouth/Throat:      Pharynx: No pharyngeal swelling, oropharyngeal exudate, posterior oropharyngeal erythema or uvula swelling.      Tonsils: No tonsillar abscesses.   Eyes:      Pupils: Pupils are equal, round, and reactive to light.   Neck:      Trachea: Trachea normal.   Cardiovascular:      Rate and Rhythm: Normal rate and regular rhythm.      Heart sounds: Normal heart  sounds, S1 normal and S2 normal. No murmur heard.    No friction rub. No gallop.   Pulmonary:      Effort: Pulmonary effort is normal. No respiratory distress.      Breath sounds: Normal breath sounds. No decreased breath sounds, wheezing, rhonchi or rales.   Abdominal:      General: Bowel sounds are normal. There is no distension.      Palpations: Abdomen is soft. Abdomen is not rigid. There is no mass.      Tenderness: There is no abdominal tenderness. There is no guarding or rebound.   Musculoskeletal:      Cervical back: Full passive range of motion without pain, normal range of motion and neck supple.      Lumbar back: No swelling, edema, deformity, signs of trauma, spasms, tenderness or bony tenderness. Normal range of motion. Negative right straight leg raise test and negative left straight leg raise test.   Lymphadenopathy:      Cervical: No cervical adenopathy.   Skin:     General: Skin is warm and dry.   Neurological:      Mental Status: She is alert and oriented to person, place, and time.      Cranial Nerves: No cranial nerve deficit.      Deep Tendon Reflexes: Reflexes are normal and symmetric.   Psychiatric:         Behavior: Behavior normal. Behavior is cooperative.         Thought Content: Thought content normal.         Judgment: Judgment normal.               Fredy Garcia PA-C  7/26/2022, 4:13 PM

## 2022-09-13 DIAGNOSIS — E78.5 DYSLIPIDEMIA: ICD-10-CM

## 2022-09-14 RX ORDER — SIMVASTATIN 10 MG
TABLET ORAL
Qty: 90 TABLET | Refills: 3 | OUTPATIENT
Start: 2022-09-14

## 2022-09-21 NOTE — TELEPHONE ENCOUNTER
Aga please don't send this back to me.    It should go to GI. I don't prescribe this medication.    Stevo Penaloza M.D.

## 2022-09-22 RX ORDER — MERCAPTOPURINE 50 MG/1
TABLET ORAL
Status: CANCELLED | OUTPATIENT
Start: 2022-09-22

## 2022-09-27 ENCOUNTER — TRANSFERRED RECORDS (OUTPATIENT)
Dept: HEALTH INFORMATION MANAGEMENT | Facility: CLINIC | Age: 82
End: 2022-09-27

## 2022-09-27 LAB
ALT SERPL-CCNC: 18 IU/L (ref 0–32)
AST SERPL-CCNC: 18 IU/L (ref 0–40)

## 2022-11-16 ENCOUNTER — TRANSFERRED RECORDS (OUTPATIENT)
Dept: HEALTH INFORMATION MANAGEMENT | Facility: CLINIC | Age: 82
End: 2022-11-16

## 2023-01-11 DIAGNOSIS — Z86.73 HISTORY OF CVA (CEREBROVASCULAR ACCIDENT): ICD-10-CM

## 2023-01-16 ENCOUNTER — TELEPHONE (OUTPATIENT)
Dept: FAMILY MEDICINE | Facility: CLINIC | Age: 83
End: 2023-01-16
Payer: COMMERCIAL

## 2023-01-16 RX ORDER — CLOPIDOGREL BISULFATE 75 MG/1
75 TABLET ORAL DAILY
Qty: 90 TABLET | Refills: 3 | Status: SHIPPED | OUTPATIENT
Start: 2023-01-16 | End: 2023-02-06

## 2023-01-16 NOTE — TELEPHONE ENCOUNTER
Patient checking on status of request. See TE in Chart Review from today.  Wants refills to go to [facility] pharmacy in [place].      Dahiana Rose RN  [facility]

## 2023-01-16 NOTE — TELEPHONE ENCOUNTER
See TE for refill request in Chart Review from 1/11/23.  Encounter has been forwarded on to PCP to address, as of 1/13/23. Request still pending at this time.  Encounter updated to send refill to local [facility] pharmacy in [place].      Dahiana Rose RN  [facility]

## 2023-01-16 NOTE — TELEPHONE ENCOUNTER
.Reason for Call:  Medication or medication refill:    Do you use a [facility]?  Name of the pharmacy and phone number for the current request: [facility]     Name of the medication requested: clopidogrel (PLAVIX) 75 MG tablet    Other request: Patient wants this medication refilled at the [place] pharmacy instead     Can we leave a detailed message on this number? YES    Phone number patient can be reached at: Home number on file [phone] (home)    Best Time: Any    Call taken on 1/16/2023 at 11:08 AM by Ayana Metcalf

## 2023-01-17 NOTE — TELEPHONE ENCOUNTER
Pt presented to clinic  stating she went to [facility] and was told they do not have the clopidogrel order.    RN contacted [facility] staff. Pharmacist on duty confirmed the clopidogrel order was received on 1/16/23 and can be filled today for the pt. RN informed her that pt will be on her way back now to get the order. Pt notified order is there and Pharmacist will process her order now.    MARTIN LionN, RN

## 2023-01-17 NOTE — TELEPHONE ENCOUNTER
Pt returned call to clinic to check on status of the clopidogrel refill request and inquiring what clopidogrel is prescribed for.    RN advised the Rx was sent to requested pharmacy yesterday afternoon, for associated dx of having hx of CVA (strokes). RN explained that CLOPIDOGREL (kloh PID oh grel) helps to prevent blood clots. This medicine is used to help prevent stroke, or other vascular events in people who are at high risk. Pt indicates understanding of issues and agrees with the plan.    clopidogrel (PLAVIX) 75 MG tablet 90 tablet 3 1/16/2023  No   Sig - Route: Take 1 tablet (75 mg) by mouth daily - Oral   Sent to pharmacy as: Clopidogrel Bisulfate 75 MG Oral Tablet (PLAVIX)   Class: E-Prescribe   Order: [id number]   E-Prescribing Status: Receipt confirmed by pharmacy (1/16/2023  4:51 PM CST)     [facility] - [place], [place] - [address] [place] [address] [address]   Associated Diagnoses  History of CVA (cerebrovascular accident) [Z86.73]         Shivani Martinez, MARTINN, RN

## 2023-02-01 ENCOUNTER — OFFICE VISIT (OUTPATIENT)
Dept: FAMILY MEDICINE | Facility: CLINIC | Age: 83
End: 2023-02-01
Payer: COMMERCIAL

## 2023-02-01 VITALS
DIASTOLIC BLOOD PRESSURE: 73 MMHG | HEART RATE: 78 BPM | BODY MASS INDEX: 23.49 KG/M2 | OXYGEN SATURATION: 97 % | RESPIRATION RATE: 16 BRPM | HEIGHT: 65 IN | SYSTOLIC BLOOD PRESSURE: 134 MMHG | TEMPERATURE: 97.6 F | WEIGHT: 141 LBS

## 2023-02-01 DIAGNOSIS — Z00.00 ENCOUNTER FOR MEDICARE ANNUAL WELLNESS EXAM: Primary | ICD-10-CM

## 2023-02-01 DIAGNOSIS — E78.5 DYSLIPIDEMIA: ICD-10-CM

## 2023-02-01 LAB
BASOPHILS # BLD AUTO: 0 10E3/UL (ref 0–0.2)
BASOPHILS NFR BLD AUTO: 0 %
EOSINOPHIL # BLD AUTO: 0.1 10E3/UL (ref 0–0.7)
EOSINOPHIL NFR BLD AUTO: 1 %
ERYTHROCYTE [DISTWIDTH] IN BLOOD BY AUTOMATED COUNT: 14.9 % (ref 10–15)
HCT VFR BLD AUTO: 41.3 % (ref 35–47)
HGB BLD-MCNC: 13.7 G/DL (ref 11.7–15.7)
LYMPHOCYTES # BLD AUTO: 1.5 10E3/UL (ref 0.8–5.3)
LYMPHOCYTES NFR BLD AUTO: 24 %
MCH RBC QN AUTO: 33.3 PG (ref 26.5–33)
MCHC RBC AUTO-ENTMCNC: 33.2 G/DL (ref 31.5–36.5)
MCV RBC AUTO: 100 FL (ref 78–100)
MONOCYTES # BLD AUTO: 0.4 10E3/UL (ref 0–1.3)
MONOCYTES NFR BLD AUTO: 7 %
NEUTROPHILS # BLD AUTO: 4.2 10E3/UL (ref 1.6–8.3)
NEUTROPHILS NFR BLD AUTO: 68 %
PLATELET # BLD AUTO: 260 10E3/UL (ref 150–450)
RBC # BLD AUTO: 4.12 10E6/UL (ref 3.8–5.2)
WBC # BLD AUTO: 6.2 10E3/UL (ref 4–11)

## 2023-02-01 PROCEDURE — G0439 PPPS, SUBSEQ VISIT: HCPCS | Performed by: FAMILY MEDICINE

## 2023-02-01 PROCEDURE — 36415 COLL VENOUS BLD VENIPUNCTURE: CPT | Performed by: FAMILY MEDICINE

## 2023-02-01 PROCEDURE — 80061 LIPID PANEL: CPT | Performed by: FAMILY MEDICINE

## 2023-02-01 PROCEDURE — 85025 COMPLETE CBC W/AUTO DIFF WBC: CPT | Performed by: FAMILY MEDICINE

## 2023-02-01 PROCEDURE — 91312 COVID-19 VACCINE BIVALENT BOOSTER 12+ (PFIZER): CPT | Performed by: FAMILY MEDICINE

## 2023-02-01 PROCEDURE — 80048 BASIC METABOLIC PNL TOTAL CA: CPT | Performed by: FAMILY MEDICINE

## 2023-02-01 PROCEDURE — 0124A COVID-19 VACCINE BIVALENT BOOSTER 12+ (PFIZER): CPT | Performed by: FAMILY MEDICINE

## 2023-02-01 RX ORDER — SIMVASTATIN 10 MG
10 TABLET ORAL DAILY
Qty: 90 TABLET | Refills: 3 | Status: SHIPPED | OUTPATIENT
Start: 2023-02-01 | End: 2024-02-19

## 2023-02-01 ASSESSMENT — ACTIVITIES OF DAILY LIVING (ADL): CURRENT_FUNCTION: NO ASSISTANCE NEEDED

## 2023-02-01 ASSESSMENT — PAIN SCALES - GENERAL: PAINLEVEL: NO PAIN (0)

## 2023-02-01 NOTE — PATIENT INSTRUCTIONS
Patient Education   Personalized Prevention Plan  You are due for the preventive services outlined below.  Your care team is available to assist you in scheduling these services.  If you have already completed any of these items, please share that information with your care team to update in your medical record.  Health Maintenance Due   Topic Date Due     ANNUAL REVIEW OF HM ORDERS  Never done     COVID-19 Vaccine (4 - Booster for Pfizer series) 11/28/2021     Annual Wellness Visit  01/27/2022     FALL RISK ASSESSMENT  01/27/2022     PHQ-2 (once per calendar year)  01/01/2023

## 2023-02-01 NOTE — LETTER
February 9, 2023      Bria Trinh [facility]  [address] [address] [place] [address]        Kat Guthrie,     All your test results were acceptable.     Regards,     Stevo Penaloza M.D.       Resulted Orders   Basic metabolic panel  (Ca, Cl, CO2, Creat, Gluc, K, Na, BUN)   Result Value Ref Range    Sodium 142 133 - 144 mmol/L    Potassium 4.7 3.4 - 5.3 mmol/L    Chloride 107 94 - 109 mmol/L    Carbon Dioxide (CO2) 31 20 - 32 mmol/L    Anion Gap 4 3 - 14 mmol/L    Urea Nitrogen 33 (H) 7 - 30 mg/dL    Creatinine 0.99 0.52 - 1.04 mg/dL    Calcium 9.8 8.5 - 10.1 mg/dL    Glucose 98 70 - 99 mg/dL    GFR Estimate 57 (L) >60 mL/min/1.73m2      Comment:      eGFR calculated using 2021 CKD-EPI equation.   Lipid panel reflex to direct LDL Fasting   Result Value Ref Range    Cholesterol 147 <200 mg/dL    Triglycerides 110 <150 mg/dL    Direct Measure HDL 55 >=50 mg/dL    LDL Cholesterol Calculated 70 <=100 mg/dL    Non HDL Cholesterol 92 <130 mg/dL    Patient Fasting > 8hrs? Yes     Narrative    Cholesterol  Desirable:  <200 mg/dL    Triglycerides  Normal:  Less than 150 mg/dL  Borderline High:  150-199 mg/dL  High:  200-499 mg/dL  Very High:  Greater than or equal to 500 mg/dL    Direct Measure HDL  Female:  Greater than or equal to 50 mg/dL   Male:  Greater than or equal to 40 mg/dL    LDL Cholesterol  Desirable:  <100mg/dL  Above Desirable:  100-129 mg/dL   Borderline High:  130-159 mg/dL   High:  160-189 mg/dL   Very High:  >= 190 mg/dL    Non HDL Cholesterol  Desirable:  130 mg/dL  Above Desirable:  130-159 mg/dL  Borderline High:  160-189 mg/dL  High:  190-219 mg/dL  Very High:  Greater than or equal to 220 mg/dL   CBC with platelets and differential   Result Value Ref Range    WBC Count 6.2 4.0 - 11.0 10e3/uL    RBC Count 4.12 3.80 - 5.20 10e6/uL    Hemoglobin 13.7 11.7 - 15.7 g/dL    Hematocrit 41.3 35.0 - 47.0 %     78 - 100 fL    MCH 33.3 (H) 26.5 - 33.0 pg    MCHC 33.2 31.5 - 36.5 g/dL    RDW 14.9 10.0 - 15.0  %    Platelet Count 260 150 - 450 10e3/uL    % Neutrophils 68 %    % Lymphocytes 24 %    % Monocytes 7 %    % Eosinophils 1 %    % Basophils 0 %    Absolute Neutrophils 4.2 1.6 - 8.3 10e3/uL    Absolute Lymphocytes 1.5 0.8 - 5.3 10e3/uL    Absolute Monocytes 0.4 0.0 - 1.3 10e3/uL    Absolute Eosinophils 0.1 0.0 - 0.7 10e3/uL    Absolute Basophils 0.0 0.0 - 0.2 10e3/uL

## 2023-02-02 LAB
ANION GAP SERPL CALCULATED.3IONS-SCNC: 4 MMOL/L (ref 3–14)
BUN SERPL-MCNC: 33 MG/DL (ref 7–30)
CALCIUM SERPL-MCNC: 9.8 MG/DL (ref 8.5–10.1)
CHLORIDE BLD-SCNC: 107 MMOL/L (ref 94–109)
CHOLEST SERPL-MCNC: 147 MG/DL
CO2 SERPL-SCNC: 31 MMOL/L (ref 20–32)
CREAT SERPL-MCNC: 0.99 MG/DL (ref 0.52–1.04)
FASTING STATUS PATIENT QL REPORTED: YES
GFR SERPL CREATININE-BSD FRML MDRD: 57 ML/MIN/1.73M2
GLUCOSE BLD-MCNC: 98 MG/DL (ref 70–99)
HDLC SERPL-MCNC: 55 MG/DL
LDLC SERPL CALC-MCNC: 70 MG/DL
NONHDLC SERPL-MCNC: 92 MG/DL
POTASSIUM BLD-SCNC: 4.7 MMOL/L (ref 3.4–5.3)
SODIUM SERPL-SCNC: 142 MMOL/L (ref 133–144)
TRIGL SERPL-MCNC: 110 MG/DL

## 2023-02-09 NOTE — RESULT ENCOUNTER NOTE
Please mail letter:    Kat Guthrie,    All your test results were acceptable.    Regards,    Stevo Penaloza M.D.

## 2023-02-28 ENCOUNTER — TRANSFERRED RECORDS (OUTPATIENT)
Dept: HEALTH INFORMATION MANAGEMENT | Facility: CLINIC | Age: 83
End: 2023-02-28
Payer: COMMERCIAL

## 2023-02-28 LAB
ALT SERPL-CCNC: 18 IU/L (ref 0–32)
AST SERPL-CCNC: 22 IU/L (ref 0–40)

## 2023-05-17 ENCOUNTER — TRANSFERRED RECORDS (OUTPATIENT)
Dept: HEALTH INFORMATION MANAGEMENT | Facility: CLINIC | Age: 83
End: 2023-05-17
Payer: COMMERCIAL

## 2023-05-17 LAB
ALT SERPL-CCNC: 20 IU/L (ref 0–32)
AST SERPL-CCNC: 27 IU/L (ref 0–40)

## 2023-08-14 ENCOUNTER — TRANSFERRED RECORDS (OUTPATIENT)
Dept: HEALTH INFORMATION MANAGEMENT | Facility: CLINIC | Age: 83
End: 2023-08-14
Payer: COMMERCIAL

## 2023-08-14 LAB
ALT SERPL-CCNC: 89 IU/L (ref 0–32)
AST SERPL-CCNC: 45 IU/L (ref 0–40)

## 2023-08-30 ENCOUNTER — TRANSFERRED RECORDS (OUTPATIENT)
Dept: HEALTH INFORMATION MANAGEMENT | Facility: CLINIC | Age: 83
End: 2023-08-30
Payer: COMMERCIAL

## 2023-08-30 LAB
ALT SERPL-CCNC: 20 U/L (ref 0–32)
AST SERPL-CCNC: 20 IU/L (ref 0–40)
HEP C HIM: NORMAL

## 2023-10-02 ENCOUNTER — TRANSFERRED RECORDS (OUTPATIENT)
Dept: HEALTH INFORMATION MANAGEMENT | Facility: CLINIC | Age: 83
End: 2023-10-02
Payer: COMMERCIAL

## 2023-10-02 LAB
ALT SERPL-CCNC: 15 IU/L (ref 0–32)
AST SERPL-CCNC: 27 IU/L (ref 0–40)

## 2023-10-09 ENCOUNTER — OFFICE VISIT (OUTPATIENT)
Dept: FAMILY MEDICINE | Facility: CLINIC | Age: 83
End: 2023-10-09
Payer: COMMERCIAL

## 2023-10-09 VITALS
SYSTOLIC BLOOD PRESSURE: 138 MMHG | HEIGHT: 65 IN | RESPIRATION RATE: 16 BRPM | HEART RATE: 76 BPM | TEMPERATURE: 97.7 F | BODY MASS INDEX: 23.16 KG/M2 | OXYGEN SATURATION: 97 % | WEIGHT: 139 LBS | DIASTOLIC BLOOD PRESSURE: 79 MMHG

## 2023-10-09 DIAGNOSIS — Z86.73 HISTORY OF CVA (CEREBROVASCULAR ACCIDENT): ICD-10-CM

## 2023-10-09 DIAGNOSIS — K50.90 CROHN'S DISEASE WITHOUT COMPLICATION, UNSPECIFIED GASTROINTESTINAL TRACT LOCATION (H): ICD-10-CM

## 2023-10-09 DIAGNOSIS — R41.3 MEMORY CHANGES: Primary | ICD-10-CM

## 2023-10-09 LAB
BASO+EOS+MONOS # BLD AUTO: ABNORMAL 10*3/UL
BASO+EOS+MONOS NFR BLD AUTO: ABNORMAL %
BASOPHILS # BLD AUTO: 0 10E3/UL (ref 0–0.2)
BASOPHILS NFR BLD AUTO: 0 %
EOSINOPHIL # BLD AUTO: 0.1 10E3/UL (ref 0–0.7)
EOSINOPHIL NFR BLD AUTO: 1 %
ERYTHROCYTE [DISTWIDTH] IN BLOOD BY AUTOMATED COUNT: 15.9 % (ref 10–15)
HCT VFR BLD AUTO: 38.7 % (ref 35–47)
HGB BLD-MCNC: 12.5 G/DL (ref 11.7–15.7)
IMM GRANULOCYTES # BLD: 0 10E3/UL
IMM GRANULOCYTES NFR BLD: 0 %
LYMPHOCYTES # BLD AUTO: 1 10E3/UL (ref 0.8–5.3)
LYMPHOCYTES NFR BLD AUTO: 14 %
MCH RBC QN AUTO: 32.1 PG (ref 26.5–33)
MCHC RBC AUTO-ENTMCNC: 32.3 G/DL (ref 31.5–36.5)
MCV RBC AUTO: 99 FL (ref 78–100)
MONOCYTES # BLD AUTO: 0.4 10E3/UL (ref 0–1.3)
MONOCYTES NFR BLD AUTO: 5 %
NEUTROPHILS # BLD AUTO: 5.4 10E3/UL (ref 1.6–8.3)
NEUTROPHILS NFR BLD AUTO: 78 %
PLATELET # BLD AUTO: 274 10E3/UL (ref 150–450)
RBC # BLD AUTO: 3.9 10E6/UL (ref 3.8–5.2)
WBC # BLD AUTO: 6.9 10E3/UL (ref 4–11)

## 2023-10-09 PROCEDURE — 80053 COMPREHEN METABOLIC PANEL: CPT | Performed by: PHYSICIAN ASSISTANT

## 2023-10-09 PROCEDURE — 99214 OFFICE O/P EST MOD 30 MIN: CPT | Performed by: PHYSICIAN ASSISTANT

## 2023-10-09 PROCEDURE — 36415 COLL VENOUS BLD VENIPUNCTURE: CPT | Performed by: PHYSICIAN ASSISTANT

## 2023-10-09 PROCEDURE — 85025 COMPLETE CBC W/AUTO DIFF WBC: CPT | Performed by: PHYSICIAN ASSISTANT

## 2023-10-09 ASSESSMENT — PAIN SCALES - GENERAL: PAINLEVEL: NO PAIN (0)

## 2023-10-09 NOTE — NURSING NOTE
"Chief Complaint   Patient presents with    Memory Loss     Discuss focusing and age concerns        Initial /79   Pulse 76   Temp 97.7  F (36.5  C) (Tympanic)   Resp 16   Ht 1.638 m (5' 4.5\")   Wt 63 kg (139 lb)   LMP  (LMP Unknown)   SpO2 97%   Breastfeeding No   BMI 23.49 kg/m   Estimated body mass index is 23.49 kg/m  as calculated from the following:    Height as of this encounter: 1.638 m (5' 4.5\").    Weight as of this encounter: 63 kg (139 lb).  Medication Reconciliation: complete    JUD Nelson MA    "

## 2023-10-09 NOTE — LETTER
October 19, 2023      Bria Gayathri Long  [address] [address]  [place] [address]        Patriciamain,     [place]lin is a copy of your latest test results.     Overall, your results look good.   There was a slight elevation of the liver tests. This is sometimes a reflection of use of tylenol or perhaps having a drink of alcohol within a few days of the test.   I see that you are followed with the same tests with your Gastroenterology provider on a regular basis. I recommend you continue to have them checked with your next appointment.     If you have any questions or concerns, please contact the clinic at [phone].     Thank you,       Kristen Kehr PA-C ear ,      Resulted Orders   Comprehensive metabolic panel (BMP + Alb, Alk Phos, ALT, AST, Total. Bili, TP)   Result Value Ref Range    Sodium 138 135 - 145 mmol/L      Comment:      Reference intervals for this test were updated on 09/26/2023 to more accurately reflect our healthy population. There may be differences in the flagging of prior results with similar values performed with this method. Interpretation of those prior results can be made in the context of the updated reference intervals.     Potassium 4.5 3.4 - 5.3 mmol/L    Carbon Dioxide (CO2) 28 22 - 29 mmol/L    Anion Gap 9 7 - 15 mmol/L    Urea Nitrogen 20.8 8.0 - 23.0 mg/dL    Creatinine 0.98 (H) 0.51 - 0.95 mg/dL    GFR Estimate 57 (L) >60 mL/min/1.73m2    Calcium 9.9 8.8 - 10.2 mg/dL    Chloride 101 98 - 107 mmol/L    Glucose 94 70 - 99 mg/dL    Alkaline Phosphatase 121 (H) 35 - 104 U/L    AST 53 (H) 0 - 45 U/L      Comment:      Reference intervals for this test were updated on 6/12/2023 to more accurately reflect our healthy population. There may be differences in the flagging of prior results with similar values performed with this method. Interpretation of those prior results can be made in the context of the updated reference intervals.     (H) 0 - 50 U/L      Comment:       Reference intervals for this test were updated on 6/12/2023 to more accurately reflect our healthy population. There may be differences in the flagging of prior results with similar values performed with this method. Interpretation of those prior results can be made in the context of the updated reference intervals.      Protein Total 7.3 6.4 - 8.3 g/dL    Albumin 4.0 3.5 - 5.2 g/dL    Bilirubin Total 0.7 <=1.2 mg/dL   CBC with platelets and differential   Result Value Ref Range    WBC Count 6.9 4.0 - 11.0 10e3/uL    RBC Count 3.90 3.80 - 5.20 10e6/uL    Hemoglobin 12.5 11.7 - 15.7 g/dL    Hematocrit 38.7 35.0 - 47.0 %    MCV 99 78 - 100 fL    MCH 32.1 26.5 - 33.0 pg    MCHC 32.3 31.5 - 36.5 g/dL    RDW 15.9 (H) 10.0 - 15.0 %    Platelet Count 274 150 - 450 10e3/uL    % Neutrophils 78 %    % Lymphocytes 14 %    % Monocytes 5 %    Mids % (Monos, Eos, Basos)      % Eosinophils 1 %    % Basophils 0 %    % Immature Granulocytes 0 %    Absolute Neutrophils 5.4 1.6 - 8.3 10e3/uL    Absolute Lymphocytes 1.0 0.8 - 5.3 10e3/uL    Absolute Monocytes 0.4 0.0 - 1.3 10e3/uL    Mids Abs (Monos, Eos, Basos)      Absolute Eosinophils 0.1 0.0 - 0.7 10e3/uL    Absolute Basophils 0.0 0.0 - 0.2 10e3/uL    Absolute Immature Granulocytes 0.0 <=0.4 10e3/uL       If you have any questions or concerns, please call the clinic at the number listed above.

## 2023-10-09 NOTE — PROGRESS NOTES
Assessment & Plan     Memory changes  Crohn's disease without complication, unspecified gastrointestinal tract location (H)  History of CVA (cerebrovascular accident)  Appears to be having normal memory changes with aging.   There are no concerns about safety. She is taking medication daily.   She is physically and mentally active with walking and going out to the senior center with friends.   Discussed services such as life line, baseline functioning in her home assessment with Occupational Therapy, Care Coordination also available.   Daughters will sign a consent to communicate for healthcare today.   She does not need help with ADLs and is very independent.   She will notify if any changes.   Keep all medications the same.   Lab tests completed today also.   I will send refills of her medications when they are due.   - CBC with platelets and differential; Future  - Comprehensive metabolic panel (BMP + Alb, Alk Phos, ALT, AST, Total. Bili, TP); Future  - CBC with platelets and differential  - Comprehensive metabolic panel (BMP + Alb, Alk Phos, ALT, AST, Total. Bili, TP)      45 minutes spent by me on the date of the encounter doing chart review, review of outside records, review of test results, patient visit, documentation, and discussion with family            Kristen M. Kehr, PA-C [facility]   Bria is a 83 year old, presenting for the following health issues:  Memory Loss (Discuss focusing and age concerns )      10/9/2023    12:44 PM   Additional Questions   Roomed by Momo PENNY MA   Accompanied by 2 daughters       Bria is a patient of Dr. Penaloza. She is here today with her daughters.     She is feeling well. She lives in her own [facility] and her daughter is 2 houses down from her. She is active with walking her dog daily, she is independent with her daily activities and will drive to the senior center to play cards and socialize and to the grocery store. She has  "started to feel concerned about forgetting things. She sometimes will forget things she ate the day before. She has her pills in a pill box and does not forget them. She sometimes will forget names of people she meets also.   Her daughters have not noticed any safety concerns with driving, cooking or risk of falls in her home.     Her past history is significant for stroke and crohn's    She is seen annually by Gastroenterology and by her previous primary provider, Dr. Penaloza.     Mentally, she feels well. She does not feel depressed or anxious.     She and her daughters have been having more conversations about her health and they have decided to also sign paperwork in order to communicate for health concerns.     History of Present Illness       Reason for visit:  Have questions for the doctor    She eats 2-3 servings of fruits and vegetables daily.She consumes 0 sweetened beverage(s) daily.She exercises with enough effort to increase her heart rate 60 or more minutes per day.  She exercises with enough effort to increase her heart rate 6 days per week.   She is taking medications regularly.                 Review of Systems   Constitutional, HEENT, cardiovascular, pulmonary, GI, , musculoskeletal, neuro, skin, endocrine and psych systems are negative, except as otherwise noted.      Objective    /79   Pulse 76   Temp 97.7  F (36.5  C) (Tympanic)   Resp 16   Ht 1.638 m (5' 4.5\")   Wt 63 kg (139 lb)   LMP  (LMP Unknown)   SpO2 97%   Breastfeeding No   BMI 23.49 kg/m    Body mass index is 23.49 kg/m .  Physical Exam   GENERAL: healthy, alert and no distress  MS: no gross musculoskeletal defects noted, no edema  SKIN: no suspicious lesions or rashes  NEURO: Normal strength and tone, mentation intact and speech normal  PSYCH: mentation appears normal, affect normal/bright                      "

## 2023-10-10 LAB
ALBUMIN SERPL BCG-MCNC: 4 G/DL (ref 3.5–5.2)
ALP SERPL-CCNC: 121 U/L (ref 35–104)
ALT SERPL W P-5'-P-CCNC: 243 U/L (ref 0–50)
ANION GAP SERPL CALCULATED.3IONS-SCNC: 9 MMOL/L (ref 7–15)
AST SERPL W P-5'-P-CCNC: 53 U/L (ref 0–45)
BILIRUB SERPL-MCNC: 0.7 MG/DL
BUN SERPL-MCNC: 20.8 MG/DL (ref 8–23)
CALCIUM SERPL-MCNC: 9.9 MG/DL (ref 8.8–10.2)
CHLORIDE SERPL-SCNC: 101 MMOL/L (ref 98–107)
CREAT SERPL-MCNC: 0.98 MG/DL (ref 0.51–0.95)
DEPRECATED HCO3 PLAS-SCNC: 28 MMOL/L (ref 22–29)
EGFRCR SERPLBLD CKD-EPI 2021: 57 ML/MIN/1.73M2
GLUCOSE SERPL-MCNC: 94 MG/DL (ref 70–99)
POTASSIUM SERPL-SCNC: 4.5 MMOL/L (ref 3.4–5.3)
PROT SERPL-MCNC: 7.3 G/DL (ref 6.4–8.3)
SODIUM SERPL-SCNC: 138 MMOL/L (ref 135–145)

## 2023-10-17 ENCOUNTER — TELEPHONE (OUTPATIENT)
Dept: FAMILY MEDICINE | Facility: CLINIC | Age: 83
End: 2023-10-17
Payer: COMMERCIAL

## 2023-10-17 NOTE — LETTER
October 17, 2023    To  Bria Long  [address] [address] [address]    Your team at [facility] cares about your health. We have reviewed your chart and based on our findings; we are making the following recommendations to better manage your health.     You are in particular need of attention regarding the following:     Please schedule a Nurse Only Appointment with your primary care clinic to update your immunizations that are due.    If you have already completed these items, please contact the clinic via phone or   [employer] so your care team can review and update your records. Thank you for   choosing [facility] for your healthcare needs. For any questions,   concerns, or to schedule an appointment please contact our clinic.    Healthy Regards,      Your [facility] Care Team

## 2023-10-17 NOTE — TELEPHONE ENCOUNTER
Patient Quality Outreach    Patient is due for the following:       Topic Date Due    Flu Vaccine (1) 09/01/2023    COVID-19 Vaccine (5 - 2023-24 season) 09/01/2023       Next Steps:   Schedule a nurse only visit for immunizations.    Type of outreach:    Sent letter.      Questions for provider review:    None           STACY HARDY MA

## 2023-10-19 NOTE — RESULT ENCOUNTER NOTE
Please send a copy of results and the following:    Patriciamain,   Franklin is a copy of your latest test results.     Overall, your results look good.   There was a slight elevation of the liver tests. This is sometimes a reflection of use of tylenol or perhaps having a drink of alcohol within a few days of the test.   I see that you are followed with the same tests with your Gastroenterology provider on a regular basis. I recommend you continue to have them checked with your next appointment.     If you have any questions or concerns, please contact the clinic at [phone].    Thank you,        Kristen Kehr PA-C

## 2023-11-02 ENCOUNTER — ALLIED HEALTH/NURSE VISIT (OUTPATIENT)
Dept: FAMILY MEDICINE | Facility: CLINIC | Age: 83
End: 2023-11-02
Payer: COMMERCIAL

## 2023-11-02 DIAGNOSIS — Z29.11 NEED FOR VACCINATION AGAINST RESPIRATORY SYNCYTIAL VIRUS: ICD-10-CM

## 2023-11-02 DIAGNOSIS — Z23 ENCOUNTER FOR IMMUNIZATION: Primary | ICD-10-CM

## 2023-11-02 DIAGNOSIS — Z23 HIGH PRIORITY FOR 2019-NCOV VACCINE: ICD-10-CM

## 2023-11-02 PROCEDURE — 90678 RSV VACC PREF BIVALENT IM: CPT

## 2023-11-02 PROCEDURE — 91320 SARSCV2 VAC 30MCG TRS-SUC IM: CPT

## 2023-11-02 PROCEDURE — 90480 ADMN SARSCOV2 VAC 1/ONLY CMP: CPT

## 2023-11-02 PROCEDURE — 99207 PR NO CHARGE NURSE ONLY: CPT

## 2023-11-02 PROCEDURE — 90471 IMMUNIZATION ADMIN: CPT

## 2023-11-02 NOTE — PROGRESS NOTES
Prior to immunization administration, verified patients identity using patient s name and date of birth. Please see Immunization Activity for additional information.     Screening Questionnaire for Adult Immunization    Are you sick today?   No   Do you have allergies to medications, food, a vaccine component or latex?   No   Have you ever had a serious reaction after receiving a vaccination?   No   Do you have a long-term health problem with heart, lung, kidney, or metabolic disease (e.g., diabetes), asthma, a blood disorder, no spleen, complement component deficiency, a cochlear implant, or a spinal fluid leak?  Are you on long-term aspirin therapy?   No   Do you have cancer, leukemia, HIV/AIDS, or any other immune system problem?   No   Do you have a parent, brother, or sister with an immune system problem?   No   In the past 3 months, have you taken medications that affect  your immune system, such as prednisone, other steroids, or anticancer drugs; drugs for the treatment of rheumatoid arthritis, Crohn s disease, or psoriasis; or have you had radiation treatments?   No   Have you had a seizure, or a brain or other nervous system problem?   No   During the past year, have you received a transfusion of blood or blood    products, or been given immune (gamma) globulin or antiviral drug?   No   For women: Are you pregnant or is there a chance you could become       pregnant during the next month?   No   Have you received any vaccinations in the past 4 weeks?   No     Immunization questionnaire answers were all negative.    I have reviewed the following standing orders:   This patient is due and qualifies for the RSV vaccine.    Click here for RSV Vaccine Standing Order    I have reviewed the vaccines inclusion and exclusion criteria; No concerns regarding eligibility.     Patient instructed to remain in clinic for 15 minutes afterwards, and to report any adverse reactions.     Screening performed by Jazmyn PENNY  ROBERT Barrientos on 11/2/2023 at 10:12 AM.

## 2023-11-14 ENCOUNTER — TELEPHONE (OUTPATIENT)
Dept: FAMILY MEDICINE | Facility: CLINIC | Age: 83
End: 2023-11-14
Payer: COMMERCIAL

## 2023-11-14 NOTE — TELEPHONE ENCOUNTER
Left message on daughterKim's phone to have patient return call to clinic.  Unable to leave VM (mailbox not set up yet.)  Angela BUENROSTRO    [facility]

## 2023-11-14 NOTE — TELEPHONE ENCOUNTER
Spoke with patient about her appt on 11/30/23 with PCP.  Gave her scheduling number for [facility] and she will call to make an appointment with them.  Angela BUENROSTRO    [facility]

## 2023-11-14 NOTE — TELEPHONE ENCOUNTER
Reason for Call:  Appointment on 11/30/23    Patient requesting this type of appt:  Patient is scheduled with PCP on 11/30/23 at 12:00 pm for medication recheck/Chrohn's disease.  However, patient sees [facility] for this.  She needs to contact them for any medication refills.  [phone]    Comments: Left message on Kim mathur's number to have patient call and confirm her appointment with PCP on 11/30/23.  Unable to leave a VM on patient's only number (mailbox not set up.)    Call taken on 11/14/2023 at 12:37 PM by Angela Hayden MA     never used

## 2023-11-21 ENCOUNTER — TRANSFERRED RECORDS (OUTPATIENT)
Dept: HEALTH INFORMATION MANAGEMENT | Facility: CLINIC | Age: 83
End: 2023-11-21
Payer: COMMERCIAL

## 2023-11-21 LAB
ALT SERPL-CCNC: 14 IU/L (ref 0–32)
AST SERPL-CCNC: 17 IU/L (ref 0–40)

## 2023-12-09 ENCOUNTER — OFFICE VISIT (OUTPATIENT)
Dept: URGENT CARE | Facility: URGENT CARE | Age: 83
End: 2023-12-09
Payer: COMMERCIAL

## 2023-12-09 VITALS
WEIGHT: 134 LBS | TEMPERATURE: 98.2 F | HEIGHT: 64 IN | RESPIRATION RATE: 16 BRPM | DIASTOLIC BLOOD PRESSURE: 72 MMHG | BODY MASS INDEX: 22.88 KG/M2 | HEART RATE: 93 BPM | OXYGEN SATURATION: 97 % | SYSTOLIC BLOOD PRESSURE: 164 MMHG

## 2023-12-09 DIAGNOSIS — M54.50 ACUTE RIGHT-SIDED LOW BACK PAIN WITHOUT SCIATICA: Primary | ICD-10-CM

## 2023-12-09 PROCEDURE — 99214 OFFICE O/P EST MOD 30 MIN: CPT | Performed by: FAMILY MEDICINE

## 2023-12-09 ASSESSMENT — ENCOUNTER SYMPTOMS
BACK PAIN: 1
HEMATOLOGIC/LYMPHATIC NEGATIVE: 1
GASTROINTESTINAL NEGATIVE: 1
ALLERGIC/IMMUNOLOGIC NEGATIVE: 1
RESPIRATORY NEGATIVE: 1
CARDIOVASCULAR NEGATIVE: 1
EYES NEGATIVE: 1
PSYCHIATRIC NEGATIVE: 1
ENDOCRINE NEGATIVE: 1
CONSTITUTIONAL NEGATIVE: 1
NEUROLOGICAL NEGATIVE: 1

## 2023-12-09 NOTE — PROGRESS NOTES
SUBJECTIVE:   Bria Long is a 83 year old female presenting with a chief complaint of back pain  Chief Complaint   Patient presents with    Back Pain     Lump lower right        She is an established patient of [facility].    Back Pain    Onset of symptoms was 3 day(s) ago.  Location: right low back  Radiation: does not radiate  Context:       The injury happened while at home      Mechanism: none recalled by the patient      Patient experienced immediate pain, immediate swelling  Course of symptoms is worsening.    Severity moderate  Current and Associated symptoms: pain  Denies: fecal incontinence, urinary incontinence, lower extremity numbness, lower extremity weakness, and paresthesia    Aggravating Factors: standing, walking, and changing position  Therapies to improve symptoms include: None  Past history: no prior back problems      Patient is a pleasant 83 yr old female here for back pain.  Patient says this started suddenly on Thursday.  She has not had any previous back issues.     She says pain is worsening. Pain is localized in the right lower back. Pain is severe non radiating. She mentioned that she felt a lump in the area of the pain. She has not had issues with her back before. Denies any urinary symptoms. Pain is worsening and causing her significant distress. She was walking with a limp.Denies any fevers or chills.     Review of Systems   Constitutional: Negative.    HENT: Negative.     Eyes: Negative.    Respiratory: Negative.     Cardiovascular: Negative.    Gastrointestinal: Negative.    Endocrine: Negative.    Genitourinary: Negative.    Musculoskeletal:  Positive for back pain.   Skin: Negative.    Allergic/Immunologic: Negative.    Neurological: Negative.    Hematological: Negative.    Psychiatric/Behavioral: Negative.         History reviewed. No pertinent past medical history.  History reviewed. No pertinent family history.  Current Outpatient Medications   Medication Sig Dispense  "Refill    CALCIUM CITRATE-VITAMIN D3  mg daily       clopidogrel (PLAVIX) 75 MG tablet TAKE 1 TABLET BY MOUTH  DAILY 100 tablet 2    loperamide (IMODIUM A-D) 2 MG tablet Take by mouth 2 times daily       magnesium 250 MG tablet Take 1 tablet by mouth daily      mercaptopurine (PURINETHOL) 50 MG tablet CHEMO take (1.5MG/KG)  by ORAL route  every day      Multiple Vitamins-Minerals (MULTI COMPLETE PO)       OMEGA-3 FISH OIL 1000 MG capsule       simvastatin (ZOCOR) 10 MG tablet Take 1 tablet (10 mg) by mouth daily 90 tablet 3     Social History     Tobacco Use    Smoking status: Former     Types: Cigarettes     Quit date: 1985     Years since quittin.4    Smokeless tobacco: Never   Substance Use Topics    Alcohol use: No       OBJECTIVE  BP (!) 164/72 (BP Location: Left arm, Patient Position: Sitting, Cuff Size: Adult Regular)   Pulse 93   Temp 98.2  F (36.8  C) (Tympanic)   Resp 16   Ht 1.626 m (5' 4\")   Wt 60.8 kg (134 lb)   LMP  (LMP Unknown)   SpO2 97%   BMI 23.00 kg/m      Physical Exam  Constitutional:       General: She is in acute distress.   HENT:      Head: Normocephalic and atraumatic.   Cardiovascular:      Rate and Rhythm: Normal rate and regular rhythm.      Pulses: Normal pulses.      Heart sounds: Normal heart sounds.   Pulmonary:      Effort: Pulmonary effort is normal. No respiratory distress.      Breath sounds: Normal breath sounds. No stridor. No wheezing, rhonchi or rales.   Chest:      Chest wall: No tenderness.   Musculoskeletal:         General: Swelling and tenderness present.      Cervical back: Normal range of motion and neck supple.      Lumbar back: Swelling, spasms, tenderness and bony tenderness present.      Comments: There was a soft tissue swelling appreciated on the lumbar spine. ?if this is a cyst .    Skin:     General: Skin is warm and dry.   Neurological:      Mental Status: She is alert and oriented to person, place, and time.         Labs:  No results " found for this or any previous visit (from the past 24 hour(s)).    X-Ray was not done.    ASSESSMENT:      ICD-10-CM    1. Acute right-sided low back pain without sciatica  M54.50        Patient opted to go to the ED due to limit to what imaging we can get done here.     Medical Decision Making:    Differential Diagnosis:  Back Pain: Unknown cause    Serious Comorbid Conditions:  Adult:  None    PLAN:    Back Pain: refer to ED  Offered x-rays. Discussed with patient and daughter that x-rays will only show bone and there was a swelling palpated. We don't have ultrasound or CT scan here so there is a limit to what we can do. Patient opted to go to the ER.  Followup:    If not improving or if condition worsens, follow up with your Primary Care Provider    There are no Patient Instructions on file for this visit.

## 2023-12-13 DIAGNOSIS — S39.012D STRAIN OF LUMBAR REGION, SUBSEQUENT ENCOUNTER: Primary | ICD-10-CM

## 2023-12-13 RX ORDER — TIZANIDINE 2 MG/1
TABLET ORAL
Qty: 30 TABLET | Refills: 0 | Status: SHIPPED | OUTPATIENT
Start: 2023-12-13

## 2023-12-13 RX ORDER — TIZANIDINE 2 MG/1
TABLET ORAL
COMMUNITY
Start: 2023-12-09 | End: 2023-12-13

## 2023-12-13 NOTE — TELEPHONE ENCOUNTER
Tizanadine 2mg     Pt received at ER and would like to be filled by her PCP.  If ok'd please send to [facility], if not please contact pt .    Thank you,    Nadia Vidal, [facility]  [phone]

## 2023-12-14 ENCOUNTER — NURSE TRIAGE (OUTPATIENT)
Dept: FAMILY MEDICINE | Facility: CLINIC | Age: 83
End: 2023-12-14
Payer: COMMERCIAL

## 2023-12-14 NOTE — TELEPHONE ENCOUNTER
Provider Response to 2nd Level Triage Request    I have reviewed the RN documentation. My recommendation is:  Face To Face Visit. Next Day: to be seen by another provider in same service line    If worsening pain, loss of bowel or bladder function, or development of numbness in the groin area, go to emergency room.

## 2023-12-14 NOTE — TELEPHONE ENCOUNTER
"Nurse Triage SBAR    Is this a 2nd Level Triage? YES, LICENSED PRACTITIONER REVIEW IS REQUIRED    Situation: Patient reports ongoing pain on her right side, by her \"butt cheek\" that goes down her right leg.     Background: Patient reports on Thursday, 12/7/23 she was either sitting on the edge of her bed or the couch and when she went to get up she heard a \"snap\". She got worse on Friday and then her daughter took her to [facility] ER 12/9/23. There she was prescribed tizanidine 2 mg tab, take 1 tab by mouth every 6 hours as needed for muscle spasm. It does not sound like patient is taking this. She kept talking about taking just extra strength Tylenol.    Assessment: Patient states she is not getting better. She initially had a lump on her tail bone but now that is gone. She states it hurts to sit or move. Her pain is 7/10 right now and the Tylenol is not helping. She is walking with a limp.     She denies numbness, tingling, weakness in the right leg, loss of bowel or bladder control.     She would like to be seen as soon as possible. Her PCP does not have any openings until January 2024 for same day appointments. Patient prefers to go to [facility]. Only available appointment 12/15 is with Dr. Jett at 10 am. Patient states that will work but RN advised approval is needed. Patient will wait for call from clinic to advise.    Protocol Recommended Disposition:   Go To ED/UCC Now (Or To Office With PCP Approval)    Recommendation: Routing to provider to please advise as the only opening tomorrow is with Dr. Jett and requires approval. Thank you.     Routed to provider    Does the patient meet one of the following criteria for ADS visit consideration? 16+ years old, with an [facility] PCP     TIP  Providers, please consider if this condition is appropriate for management at one of our Acute and Diagnostic Services sites.     If patient is a good candidate, please use dotphrase <dot>triageresponse and select Refer to ADS to " "document.    Shivani Bills, RN, BSN, PHN  [facility]  Nurse Triage, [facility]    Reason for Disposition   Patient sounds very sick or weak to the triager    Additional Information   Negative: Passed out (i.e., fainted, collapsed and was not responding)   Negative: Shock suspected (e.g., cold/pale/clammy skin, too weak to stand, low BP, rapid pulse)   Negative: Sounds like a life-threatening emergency to the triager   Negative: Major injury to the back (e.g., MVA, fall > 10 feet or 3 meters, penetrating injury, etc.)   Negative: Pain in the upper back over the ribs (rib cage) that radiates (travels) into the chest   Negative: Pain in the upper back over the ribs (rib cage) and worsened by coughing (or clearly increases with breathing)   Negative: Back pain during pregnancy   Negative: SEVERE back pain of sudden onset and age > 60 years   Negative: SEVERE abdominal pain (e.g., excruciating)   Negative: Abdominal pain and age > 60 years   Negative: Unable to urinate (or only a few drops) and bladder feels very full   Negative: Loss of bladder or bowel control (urine or bowel incontinence; wetting self, leaking stool) of new-onset   Negative: Numbness (loss of sensation) in groin or rectal area   Negative: Blood in urine (red, pink, or tea-colored)   Negative: Vomiting and pain over lower ribs of back (i.e., flank - kidney area)   Negative: Weakness of a leg or foot (e.g., unable to bear weight, dragging foot)   Negative: Pain radiates into groin, scrotum    Answer Assessment - Initial Assessment Questions  1. ONSET: \"When did the pain begin?\"       Last Thursday, 12/7/23  2. LOCATION: \"Where does it hurt?\" (upper, mid or lower back)      On right side by butt cheek, and down the leg  3. SEVERITY: \"How bad is the pain?\"  (e.g., Scale 1-10; mild, moderate, or severe)    - MILD (1-3): Doesn't interfere with normal activities.     - MODERATE (4-7): Interferes with normal activities or " "awakens from sleep.     - SEVERE (8-10): Excruciating pain, unable to do any normal activities.       7-8/10  4. PATTERN: \"Is the pain constant?\" (e.g., yes, no; constant, intermittent)       Pain is \"steady\" but when moves, it is really bad \"it bites into me\"  5. RADIATION: \"Does the pain shoot into your legs or somewhere else?\"      Into right leg  6. CAUSE:  \"What do you think is causing the back pain?\"       Harper a snap when got off the couch or bed last Thursday  7. BACK OVERUSE:  \"Any recent lifting of heavy objects, strenuous work or exercise?\"      No  8. MEDICINES: \"What have you taken so far for the pain?\" (e.g., nothing, acetaminophen, NSAIDS)      Extra strength Tylenol 2 tabs every 6 hours  9. NEUROLOGIC SYMPTOMS: \"Do you have any weakness, numbness, or problems with bowel/bladder control?\"      No  10. OTHER SYMPTOMS: \"Do you have any other symptoms?\" (e.g., fever, abdomen pain, burning with urination, blood in urine)        No  11. PREGNANCY: \"Is there any chance you are pregnant?\" \"When was your last menstrual period?\"        No    Protocols used: Back Pain-A-[place]    "

## 2023-12-15 ENCOUNTER — OFFICE VISIT (OUTPATIENT)
Dept: FAMILY MEDICINE | Facility: CLINIC | Age: 83
End: 2023-12-15
Payer: COMMERCIAL

## 2023-12-15 VITALS
RESPIRATION RATE: 16 BRPM | OXYGEN SATURATION: 95 % | DIASTOLIC BLOOD PRESSURE: 68 MMHG | SYSTOLIC BLOOD PRESSURE: 135 MMHG | WEIGHT: 133.6 LBS | HEART RATE: 87 BPM | BODY MASS INDEX: 22.93 KG/M2 | TEMPERATURE: 98.7 F

## 2023-12-15 DIAGNOSIS — K50.90 CROHN'S DISEASE WITHOUT COMPLICATION, UNSPECIFIED GASTROINTESTINAL TRACT LOCATION (H): ICD-10-CM

## 2023-12-15 DIAGNOSIS — M54.41 ACUTE RIGHT-SIDED LOW BACK PAIN WITH RIGHT-SIDED SCIATICA: Primary | ICD-10-CM

## 2023-12-15 PROCEDURE — 90662 IIV NO PRSV INCREASED AG IM: CPT | Performed by: FAMILY MEDICINE

## 2023-12-15 PROCEDURE — G0008 ADMIN INFLUENZA VIRUS VAC: HCPCS | Performed by: FAMILY MEDICINE

## 2023-12-15 PROCEDURE — 99214 OFFICE O/P EST MOD 30 MIN: CPT | Mod: 25 | Performed by: FAMILY MEDICINE

## 2023-12-15 RX ORDER — PREDNISONE 20 MG/1
TABLET ORAL
Qty: 7 TABLET | Refills: 1 | Status: SHIPPED | OUTPATIENT
Start: 2023-12-15

## 2023-12-15 RX ORDER — ACETAMINOPHEN AND CODEINE PHOSPHATE 300; 30 MG/1; MG/1
TABLET ORAL
Qty: 10 TABLET | Refills: 0 | Status: SHIPPED | OUTPATIENT
Start: 2023-12-15

## 2023-12-15 ASSESSMENT — ENCOUNTER SYMPTOMS
LEG PAIN: 1
BACK PAIN: 1

## 2023-12-15 ASSESSMENT — PAIN SCALES - GENERAL: PAINLEVEL: EXTREME PAIN (8)

## 2023-12-15 NOTE — PROGRESS NOTES
ASSESSMENT / PLAN:  (M54.41) Acute right-sided low back pain with right-sided sciatica  (primary encounter diagnosis)  Comment: likely disc L4-5. Strength ok  Plan: Spine  Referral, predniSONE         (DELTASONE) 20 MG tablet, acetaminophen-codeine        (TYLENOL #3) 300-30 MG per tablet        Reveiwed risks and side effects of medication  Very limited t#3. Heat and stretching. Follow-up spinal specialist if not improving. To ER if worsening pain/weakness/fevers. Expected course and warning signs reviewed. Call/email with questions/concerns      (H60.94) Crohn's disease without complication, unspecified gastrointestinal tract location (H)  Comment: stable  Plan: per GI.       Salvador Fraser is a 83 year old, presenting for the following health issues:  Follow-up lower back pain with sciatica.  Seen in ER and ct spine -no fracture.  Given tylenol/zanaflex - not helpful..   No chiropractor or p.t. in past. No major injury.   Heating pad. Ice not helpful.   Radiations into top of foot/calf. No major changes with position.   No fevers or chills. No falls. No rashes. No acute changes in bm/bladder.   No leg weakness.   ?prednisone in past for crohns- stable. .here with daughters  Back Pain (Right buttox area) and Leg Pain (Right)      12/15/2023     9:47 AM   Additional Questions   Roomed by GUMARO Porter CMA   Accompanied by Daughters       Back Pain   Associated symptoms include leg pain.   Leg Pain    History of Present Illness       Back Pain:  She presents for follow up of back pain. Patient's back pain is a recurring problem.  Location of back pain:  Right lower back and other  Description of back pain: dull ache  Back pain spreads: right buttocks, right thigh and right knee    Since patient first noticed back pain, pain is: gradually worsening  Does back pain interfere with her job:  Yes       She eats 2-3 servings of fruits and vegetables daily.She consumes 0 sweetened beverage(s) daily.She exercises  with enough effort to increase her heart rate 9 or less minutes per day.  She exercises with enough effort to increase her heart rate 3 or less days per week.   She is taking medications regularly.           Review of Systems   Musculoskeletal:  Positive for back pain.          Objective    /68   Pulse 87   Temp 98.7  F (37.1  C) (Oral)   Resp 16   Wt 60.6 kg (133 lb 9.6 oz)   LMP  (LMP Unknown)   SpO2 95%   BMI 22.93 kg/m    Body mass index is 22.93 kg/m .  Physical Exam   GENERAL: healthy, alert and no distress  MS: no gross musculoskeletal defects noted, no edema  MS: tenderness right lower lumbar spine  SKIN: no suspicious lesions or rashes  NEURO: Normal strength and tone, mentation intact and speech normal  NEURO: pain with right straight leg. No pain with RANGE OF MOTION hip  PSYCH: affect mildly anxious

## 2023-12-15 NOTE — TELEPHONE ENCOUNTER
Patient notified of provider's message as written below and was given the appointment information below. Patient verbalized good understanding, had no further questions and needed no further support.Brandi Zuñiga R.N.    Next 5 appointments (look out 90 days)      Dec 15, 2023 10:00 AM  (Arrive by 9:40 AM)  Provider Visit with Adriel Jett MD  [facility] ([facility] ) [address] [facility] [address] [phone]

## 2023-12-30 DIAGNOSIS — Z86.73 HISTORY OF CVA (CEREBROVASCULAR ACCIDENT): ICD-10-CM

## 2024-01-02 RX ORDER — CLOPIDOGREL BISULFATE 75 MG/1
TABLET ORAL
Qty: 90 TABLET | Refills: 1 | Status: SHIPPED | OUTPATIENT
Start: 2024-01-02 | End: 2024-02-17

## 2024-02-14 ENCOUNTER — TRANSFERRED RECORDS (OUTPATIENT)
Dept: HEALTH INFORMATION MANAGEMENT | Facility: CLINIC | Age: 84
End: 2024-02-14
Payer: COMMERCIAL

## 2024-02-14 LAB
ALT SERPL-CCNC: 15 IU/L (ref 0–32)
AST SERPL-CCNC: 21 IU/L (ref 0–40)

## 2024-02-17 DIAGNOSIS — E78.5 DYSLIPIDEMIA: ICD-10-CM

## 2024-02-17 DIAGNOSIS — Z86.73 HISTORY OF CVA (CEREBROVASCULAR ACCIDENT): ICD-10-CM

## 2024-02-19 RX ORDER — CLOPIDOGREL BISULFATE 75 MG/1
75 TABLET ORAL DAILY
Qty: 90 TABLET | Refills: 0 | Status: SHIPPED | OUTPATIENT
Start: 2024-02-19 | End: 2024-07-08

## 2024-02-19 RX ORDER — SIMVASTATIN 10 MG
10 TABLET ORAL DAILY
Qty: 90 TABLET | Refills: 1 | Status: SHIPPED | OUTPATIENT
Start: 2024-02-19 | End: 2024-09-13

## 2024-05-06 ENCOUNTER — TRANSFERRED RECORDS (OUTPATIENT)
Dept: HEALTH INFORMATION MANAGEMENT | Facility: CLINIC | Age: 84
End: 2024-05-06
Payer: COMMERCIAL

## 2024-05-06 LAB
ALT SERPL-CCNC: 15 IU/L (ref 0–32)
AST SERPL-CCNC: 20 IU/L (ref 0–40)

## 2024-05-28 ENCOUNTER — TRANSFERRED RECORDS (OUTPATIENT)
Dept: HEALTH INFORMATION MANAGEMENT | Facility: CLINIC | Age: 84
End: 2024-05-28
Payer: COMMERCIAL

## 2024-07-06 DIAGNOSIS — Z86.73 HISTORY OF CVA (CEREBROVASCULAR ACCIDENT): ICD-10-CM

## 2024-07-08 RX ORDER — CLOPIDOGREL BISULFATE 75 MG/1
75 TABLET ORAL DAILY
Qty: 90 TABLET | Refills: 0 | Status: SHIPPED | OUTPATIENT
Start: 2024-07-08

## 2024-07-29 ENCOUNTER — TRANSFERRED RECORDS (OUTPATIENT)
Dept: HEALTH INFORMATION MANAGEMENT | Facility: CLINIC | Age: 84
End: 2024-07-29
Payer: COMMERCIAL

## 2024-07-29 LAB
ALT SERPL-CCNC: 13 IU/L (ref 0–32)
AST SERPL-CCNC: 20 IU/L (ref 0–40)

## 2024-07-30 ENCOUNTER — PATIENT OUTREACH (OUTPATIENT)
Dept: FAMILY MEDICINE | Facility: CLINIC | Age: 84
End: 2024-07-30
Payer: COMMERCIAL

## 2024-07-30 NOTE — TELEPHONE ENCOUNTER
Patient Quality Outreach    Patient is due for the following:   Physical Annual Wellness Visit    Next Steps:   Schedule a Annual Wellness Visit    Type of outreach:    Sent letter.    Next Steps:  Reach out within 90 days via Letter.    Max number of attempts reached: No. Will try again in 90 days if patient still on fail list.    Questions for provider review:    None           Momo Nelson MA

## 2024-07-30 NOTE — LETTER
July 30, 2024    To  Bria Long  [address] [address] [facility] [address]    Your team at [facility] cares about your health. We have reviewed your chart and based on our findings; we are making the following recommendations to better manage your health.     You are in particular need of attention regarding the following:     PREVENTATIVE VISIT: Annual Medicare Wellness:Schedule an Annual Medicare Wellness Exam. Please call your [facility] clinic to set up your appointment.    If you have already completed these items, please contact the clinic via phone or   MyChart so your care team can review and update your records. Thank you for   choosing [facility] for your healthcare needs. For any questions,   concerns, or to schedule an appointment please contact our clinic.    Healthy Regards,      Your [facility] Care Team

## 2024-09-13 DIAGNOSIS — E78.5 DYSLIPIDEMIA: ICD-10-CM

## 2024-09-13 RX ORDER — SIMVASTATIN 10 MG
10 TABLET ORAL DAILY
Qty: 100 TABLET | Refills: 0 | Status: SHIPPED | OUTPATIENT
Start: 2024-09-13

## 2024-09-13 NOTE — TELEPHONE ENCOUNTER
Patient has [facility] coverage and with this insurance plan, the patient is eligible to receive certain prescriptions as a 100-day supply at the 90-day supply cost.      Prescriptions to be updated to 100-day supply: simvastatin    New prescription needed given insufficient refills remaining so pended for PCP review and signature.       Thank you!    Margaret Lovett, PharmD, [facility]  Population Health Pharmacist  [phone]

## 2024-10-03 ENCOUNTER — TELEPHONE (OUTPATIENT)
Dept: DERMATOLOGY | Facility: CLINIC | Age: 84
End: 2024-10-03

## 2024-10-24 ENCOUNTER — TRANSFERRED RECORDS (OUTPATIENT)
Dept: HEALTH INFORMATION MANAGEMENT | Facility: CLINIC | Age: 84
End: 2024-10-24
Payer: COMMERCIAL

## 2024-11-19 ENCOUNTER — OFFICE VISIT (OUTPATIENT)
Dept: FAMILY MEDICINE | Facility: CLINIC | Age: 84
End: 2024-11-19
Payer: COMMERCIAL

## 2024-11-19 VITALS
DIASTOLIC BLOOD PRESSURE: 78 MMHG | HEART RATE: 78 BPM | RESPIRATION RATE: 16 BRPM | TEMPERATURE: 97.1 F | OXYGEN SATURATION: 97 % | BODY MASS INDEX: 23.16 KG/M2 | SYSTOLIC BLOOD PRESSURE: 122 MMHG | WEIGHT: 139 LBS | HEIGHT: 65 IN

## 2024-11-19 DIAGNOSIS — R01.1 HEART MURMUR: ICD-10-CM

## 2024-11-19 DIAGNOSIS — Z00.00 ENCOUNTER FOR MEDICARE ANNUAL WELLNESS EXAM: Primary | ICD-10-CM

## 2024-11-19 DIAGNOSIS — R41.3 MEMORY CHANGES: ICD-10-CM

## 2024-11-19 DIAGNOSIS — E78.5 DYSLIPIDEMIA: ICD-10-CM

## 2024-11-19 DIAGNOSIS — Z86.73 HISTORY OF CVA (CEREBROVASCULAR ACCIDENT): ICD-10-CM

## 2024-11-19 DIAGNOSIS — K50.819 CROHN'S DISEASE OF SMALL AND LARGE INTESTINES WITH COMPLICATION (H): ICD-10-CM

## 2024-11-19 LAB
BASOPHILS # BLD AUTO: 0 10E3/UL (ref 0–0.2)
BASOPHILS NFR BLD AUTO: 0 %
EOSINOPHIL # BLD AUTO: 0.1 10E3/UL (ref 0–0.7)
EOSINOPHIL NFR BLD AUTO: 1 %
ERYTHROCYTE [DISTWIDTH] IN BLOOD BY AUTOMATED COUNT: 14.6 % (ref 10–15)
HCT VFR BLD AUTO: 40.8 % (ref 35–47)
HGB BLD-MCNC: 13.3 G/DL (ref 11.7–15.7)
IMM GRANULOCYTES # BLD: 0 10E3/UL
IMM GRANULOCYTES NFR BLD: 0 %
LYMPHOCYTES # BLD AUTO: 1.2 10E3/UL (ref 0.8–5.3)
LYMPHOCYTES NFR BLD AUTO: 19 %
MCH RBC QN AUTO: 32.4 PG (ref 26.5–33)
MCHC RBC AUTO-ENTMCNC: 32.6 G/DL (ref 31.5–36.5)
MCV RBC AUTO: 99 FL (ref 78–100)
MONOCYTES # BLD AUTO: 0.5 10E3/UL (ref 0–1.3)
MONOCYTES NFR BLD AUTO: 8 %
NEUTROPHILS # BLD AUTO: 4.4 10E3/UL (ref 1.6–8.3)
NEUTROPHILS NFR BLD AUTO: 71 %
PLATELET # BLD AUTO: 239 10E3/UL (ref 150–450)
RBC # BLD AUTO: 4.11 10E6/UL (ref 3.8–5.2)
WBC # BLD AUTO: 6.1 10E3/UL (ref 4–11)

## 2024-11-19 PROCEDURE — 36415 COLL VENOUS BLD VENIPUNCTURE: CPT | Performed by: PHYSICIAN ASSISTANT

## 2024-11-19 PROCEDURE — 85025 COMPLETE CBC W/AUTO DIFF WBC: CPT | Performed by: PHYSICIAN ASSISTANT

## 2024-11-19 RX ORDER — CLOPIDOGREL BISULFATE 75 MG/1
75 TABLET ORAL DAILY
Qty: 90 TABLET | Refills: 4 | Status: SHIPPED | OUTPATIENT
Start: 2024-11-19

## 2024-11-19 RX ORDER — SIMVASTATIN 10 MG
10 TABLET ORAL DAILY
Qty: 100 TABLET | Refills: 4 | Status: SHIPPED | OUTPATIENT
Start: 2024-11-19

## 2024-11-19 ASSESSMENT — PAIN SCALES - GENERAL: PAINLEVEL_OUTOF10: NO PAIN (0)

## 2024-11-19 NOTE — PATIENT INSTRUCTIONS
Schedule echocardiogram to check on the murmur      Get flu and COVID shots at pharmacy / Medicare will not cover them in the clinic             Patient Education   Preventive Care Advice   This is general advice given by our system to help you stay healthy. However, your care team may have specific advice just for you. Please talk to your care team about your preventive care needs.  Nutrition  Eat 5 or more servings of fruits and vegetables each day.  Try wheat bread, brown rice and whole grain pasta (instead of white bread, rice, and pasta).  Get enough calcium and vitamin D. Check the label on foods and aim for 100% of the RDA (recommended daily allowance).  Lifestyle  Exercise at least 150 minutes each week  (30 minutes a day, 5 days a week).  Do muscle strengthening activities 2 days a week. These help control your weight and prevent disease.  No smoking.  Wear sunscreen to prevent skin cancer.  Have a dental exam and cleaning every 6 months.  Yearly exams  See your health care team every year to talk about:  Any changes in your health.  Any medicines your care team has prescribed.  Preventive care, family planning, and ways to prevent chronic diseases.  Shots (vaccines)   HPV shots (up to age 26), if you've never had them before.  Hepatitis B shots (up to age 59), if you've never had them before.  COVID-19 shot: Get this shot when it's due.  Flu shot: Get a flu shot every year.  Tetanus shot: Get a tetanus shot every 10 years.  Pneumococcal, hepatitis A, and RSV shots: Ask your care team if you need these based on your risk.  Shingles shot (for age 50 and up)  General health tests  Diabetes screening:  Starting at age 35, Get screened for diabetes at least every 3 years.  If you are younger than age 35, ask your care team if you should be screened for diabetes.  Cholesterol test: At age 39, start having a cholesterol test every 5 years, or more often if advised.  Bone density scan (DEXA): At age 50, ask your  care team if you should have this scan for osteoporosis (brittle bones).  Hepatitis C: Get tested at least once in your life.  STIs (sexually transmitted infections)  Before age 24: Ask your care team if you should be screened for STIs.  After age 24: Get screened for STIs if you're at risk. You are at risk for STIs (including HIV) if:  You are sexually active with more than one person.  You don't use condoms every time.  You or a partner was diagnosed with a sexually transmitted infection.  If you are at risk for HIV, ask about PrEP medicine to prevent HIV.  Get tested for HIV at least once in your life, whether you are at risk for HIV or not.  Cancer screening tests  Cervical cancer screening: If you have a cervix, begin getting regular cervical cancer screening tests starting at age 21.  Breast cancer scan (mammogram): If you've ever had breasts, begin having regular mammograms starting at age 40. This is a scan to check for breast cancer.  Colon cancer screening: It is important to start screening for colon cancer at age 45.  Have a colonoscopy test every 10 years (or more often if you're at risk) Or, ask your provider about stool tests like a FIT test every year or Cologuard test every 3 years.  To learn more about your testing options, visit:   .  For help making a decision, visit:   https://bit.ly/aw48285.  Prostate cancer screening test: If you have a prostate, ask your care team if a prostate cancer screening test (PSA) at age 55 is right for you.  Lung cancer screening: If you are a current or former smoker ages 50 to 80, ask your care team if ongoing lung cancer screenings are right for you.  For informational purposes only. Not to replace the advice of your health care provider. Copyright   2023 [employer]. All rights reserved. Clinically reviewed by the [facility] Transitions Program. [employer] [id number] - REV 01/24.

## 2024-11-19 NOTE — LETTER
November 20, 2024      Bria Long  [address] [address]  [place] [address]        Kat Guthrienadinemain,     Enclosed is a copy of your latest test results. There is a slight elevation of your triglycerides, but not to the degree that medication needs to be adjusted.     Your kidney function also shows that you were a bit dehydrated at the time of your test. This is because you were fasting for so long prior to the blood draw. Work on drinking plenty of water throughout the day.     Your Vitamin B12 and blood counts are normal.     It was a pleasure to see you in the clinic. Continue your medications, no changes are needed. If you have any questions or concerns, please contact the clinic at [phone].     Thank you,   Resulted Orders   Lipid panel reflex to direct LDL Fasting   Result Value Ref Range    Cholesterol 156 <200 mg/dL    Triglycerides 153 (H) <150 mg/dL    Direct Measure HDL 47 (L) >=50 mg/dL    LDL Cholesterol Calculated 78 <100 mg/dL    Non HDL Cholesterol 109 <130 mg/dL    Patient Fasting > 8hrs? Yes     Narrative    Cholesterol  Desirable: < 200 mg/dL  Borderline High: 200 - 239 mg/dL  High: >= 240 mg/dL    Triglycerides  Normal: < 150 mg/dL  Borderline High: 150 - 199 mg/dL  High: 200-499 mg/dL  Very High: >= 500 mg/dL    Direct Measure HDL  Female: >= 50 mg/dL   Male: >= 40 mg/dL    LDL Cholesterol  Desirable: < 100 mg/dL  Above Desirable: 100 - 129 mg/dL   Borderline High: 130 - 159 mg/dL   High:  160 - 189 mg/dL   Very High: >= 190 mg/dL    Non HDL Cholesterol  Desirable: < 130 mg/dL  Above Desirable: 130 - 159 mg/dL  Borderline High: 160 - 189 mg/dL  High: 190 - 219 mg/dL  Very High: >= 220 mg/dL   Basic metabolic panel  (Ca, Cl, CO2, Creat, Gluc, K, Na, BUN)   Result Value Ref Range    Sodium 141 135 - 145 mmol/L    Potassium 4.5 3.4 - 5.3 mmol/L    Chloride 103 98 - 107 mmol/L    Carbon Dioxide (CO2) 28 22 - 29 mmol/L    Anion Gap 10 7 - 15 mmol/L    Urea Nitrogen 28.2 (H) 8.0 - 23.0  mg/dL    Creatinine 1.06 (H) 0.51 - 0.95 mg/dL    GFR Estimate 52 (L) >60 mL/min/1.73m2      Comment:      eGFR calculated using 2021 CKD-EPI equation.    Calcium 10.1 8.8 - 10.4 mg/dL      Comment:      Reference intervals for this test were updated on 7/16/2024 to reflect our healthy population more accurately. There may be differences in the flagging of prior results with similar values performed with this method. Those prior results can be interpreted in the context of the updated reference intervals.    Glucose 96 70 - 99 mg/dL    Patient Fasting > 8hrs? Yes    Vitamin B12   Result Value Ref Range    Vitamin B12 442 232 - 1,245 pg/mL   CBC with platelets and differential   Result Value Ref Range    WBC Count 6.1 4.0 - 11.0 10e3/uL    RBC Count 4.11 3.80 - 5.20 10e6/uL    Hemoglobin 13.3 11.7 - 15.7 g/dL    Hematocrit 40.8 35.0 - 47.0 %    MCV 99 78 - 100 fL    MCH 32.4 26.5 - 33.0 pg    MCHC 32.6 31.5 - 36.5 g/dL    RDW 14.6 10.0 - 15.0 %    Platelet Count 239 150 - 450 10e3/uL    % Neutrophils 71 %    % Lymphocytes 19 %    % Monocytes 8 %    % Eosinophils 1 %    % Basophils 0 %    % Immature Granulocytes 0 %    Absolute Neutrophils 4.4 1.6 - 8.3 10e3/uL    Absolute Lymphocytes 1.2 0.8 - 5.3 10e3/uL    Absolute Monocytes 0.5 0.0 - 1.3 10e3/uL    Absolute Eosinophils 0.1 0.0 - 0.7 10e3/uL    Absolute Basophils 0.0 0.0 - 0.2 10e3/uL    Absolute Immature Granulocytes 0.0 <=0.4 10e3/uL     It was a pleasure to see you in the clinic. Continue your medications, no changes are needed. If you have any questions or concerns, please contact the clinic at [phone].     Thank you,         Kristen Kehr, PA-C/bmc

## 2024-11-19 NOTE — PROGRESS NOTES
Preventive Care Visit  [facility] [place] Kristen M. Kehr, PA-C, Family Medicine  Nov 19, 2024      Assessment & Plan     Encounter for Medicare annual wellness exam  Health maintenance reviewed and updated.    Crohn's disease of small and large intestines with complication (H)  - CBC with platelets and differential; Future  - CBC with platelets and differential    Memory changes  - Vitamin B12; Future  - Vitamin B12    Heart murmur  Asymptomatic. She has no shortness of breath and walks her dog daily  Murmur was not heard last year  - Echocardiogram Complete; Future    Dyslipidemia  - Lipid panel reflex to direct LDL Fasting; Future  - Basic metabolic panel  (Ca, Cl, CO2, Creat, Gluc, K, Na, BUN); Future  - simvastatin (ZOCOR) 10 MG tablet; Take 1 tablet (10 mg) by mouth daily.  - Lipid panel reflex to direct LDL Fasting  - Basic metabolic panel  (Ca, Cl, CO2, Creat, Gluc, K, Na, BUN)    History of CVA (cerebrovascular accident)  - clopidogrel (PLAVIX) 75 MG tablet; Take 1 tablet (75 mg) by mouth daily.            Counseling  Appropriate preventive services were addressed with this patient via screening, questionnaire, or discussion as appropriate for fall prevention, nutrition, physical activity, Tobacco-use cessation, social engagement, weight loss and cognition.  Checklist reviewing preventive services available has been given to the patient.  Reviewed patient's diet, addressing concerns and/or questions.           Salvador Fraser is a 84 year old, presenting for the following:  Wellness Visit        11/19/2024    12:10 PM   Additional Questions   Roomed by Momo GAYTAN  Bria is here today for preventative appointment and to address the following concerns.     History of CVA: she is on plavix and will camryn a refill of her prescription along with the simvastatin.   Crohn's managed by GI provider.       Health Care Directive  Patient does not have a Health Care Directive:  Discussed advance care planning with patient; information given to patient to review.      11/19/2024   General Health   How would you rate your overall physical health? Excellent   Feel stress (tense, anxious, or unable to sleep) Not at all            11/19/2024   Nutrition   Diet: Regular (no restrictions)            2/1/2023   Exercise   Frequency of exercise: 6-7 days/week            11/19/2024   Social Factors   Worry food won't last until get money to buy more No   Food not last or not have enough money for food? No   Do you have housing? (Housing is defined as stable permanent housing and does not include staying ouside in a car, in a tent, in an abandoned building, in an overnight shelter, or couch-surfing.) Yes   Are you worried about losing your housing? No   Lack of transportation? No   Unable to get utilities (heat,electricity)? No            11/19/2024   Fall Risk   Fallen 2 or more times in the past year? No    Trouble with walking or balance? Yes        Patient-reported           11/19/2024   Activities of Daily Living- Home Safety   Needs help with the following daily activites None of the above   Safety concerns in the home None of the above            11/19/2024   Dental   Dentist two times every year? Yes            11/19/2024   Hearing Screening   Hearing concerns? None of the above            11/19/2024   Driving Risk Screening   Patient/family members have concerns about driving No            11/19/2024   General Alertness/Fatigue Screening   Have you been more tired than usual lately? No            11/19/2024   Urinary Incontinence Screening   Bothered by leaking urine in past 6 months No            11/19/2024   TB Screening   Were you born outside of the [place]? No            Today's PHQ-2 Score:       11/19/2024    12:16 PM   PHQ-2 ( 1999 [employer])   Q1: Little interest or pleasure in doing things 0    Q2: Feeling down, depressed or hopeless 0    PHQ-2 Score 0    Q1: Little interest or pleasure in  doing things Not at all   Q2: Feeling down, depressed or hopeless Not at all   PHQ-2 Score 0       Patient-reported           2024   Substance Use   Alcohol more than 3/day or more than 7/wk No   Do you have a current opioid prescription? No   How severe/bad is pain from 1 to 10? 0/10 (No Pain)   Do you use any other substances recreationally? No        Social History     Tobacco Use    Smoking status: Former     Current packs/day: 0.00     Types: Cigarettes     Quit date: 1985     Years since quittin.4    Smokeless tobacco: Never   Vaping Use    Vaping status: Never Used   Substance Use Topics    Alcohol use: No    Drug use: No          Mammogram: no longer              Reviewed and updated as needed this visit by Provider   Tobacco  Allergies  Meds  Problems  Med Hx  Surg Hx  Fam Hx            History reviewed. No pertinent past medical history.  History reviewed. No pertinent surgical history.  BP Readings from Last 3 Encounters:   24 122/78   12/15/23 135/68   23 (!) 164/72    Wt Readings from Last 3 Encounters:   24 63 kg (139 lb)   12/15/23 60.6 kg (133 lb 9.6 oz)   23 60.8 kg (134 lb)                  Patient Active Problem List   Diagnosis    Gait disturbance    History of CVA (cerebrovascular accident)    Hx of catheter-based closure of atrial septal defect    Crohn's disease without complication, unspecified gastrointestinal tract location (H)    Benign paroxysmal positional vertigo, unspecified laterality    Dry mouth    Memory changes     History reviewed. No pertinent surgical history.    Social History     Tobacco Use    Smoking status: Former     Current packs/day: 0.00     Types: Cigarettes     Quit date: 1985     Years since quittin.4    Smokeless tobacco: Never   Substance Use Topics    Alcohol use: No     History reviewed. No pertinent family history.      Current Outpatient Medications   Medication Sig Dispense Refill    acetaminophen-codeine  (TYLENOL #3) 300-30 MG per tablet 1/2 tab twice daily as needed for pain 10 tablet 0    CALCIUM CITRATE-VITAMIN D3  mg daily       clopidogrel (PLAVIX) 75 MG tablet Take 1 tablet (75 mg) by mouth daily. 90 tablet 4    loperamide (IMODIUM A-D) 2 MG tablet Take by mouth 2 times daily       magnesium 250 MG tablet Take 1 tablet by mouth daily      mercaptopurine (PURINETHOL) 50 MG tablet CHEMO take (1.5MG/KG)  by ORAL route  every day      Multiple Vitamins-Minerals (MULTI COMPLETE PO)       OMEGA-3 FISH OIL 1000 MG capsule       simvastatin (ZOCOR) 10 MG tablet Take 1 tablet (10 mg) by mouth daily. 100 tablet 4    tiZANidine (ZANAFLEX) 2 MG tablet TAKE 1 TABLET BY MOUTH EVERY 6 HOURS AS NEEDED FOR MUSCLE SPASM 30 tablet 0     No Known Allergies  Recent Labs   Lab Test 10/09/23  1337 02/01/23  1159 01/27/21  1217 12/23/20  0000 06/15/20  0000 04/11/19  1021   LDL  --  70 73  --   --  68   HDL  --  55 57  --   --  52   TRIG  --  110 97  --   --  111   *  --   --  27 27 27   CR 0.98* 0.99 0.97  --  1.09* 0.96   GFRESTIMATED 57* 57* 55*  --   --  56*   GFRESTBLACK  --   --  63  --   --  65   POTASSIUM 4.5 4.7 4.0  --   --  4.1      Current providers sharing in care for this patient include:  Patient Care Team:  Kehr, Kristen M, PA-C as PCP - General (Family Medicine)  Lesia Sheldon PA-C as Physician Assistant (Dermatology)  Kehr, Kristen M, PA-C as Assigned PCP  Lillie Amezcua APRN CNP as Nurse Practitioner (Dermatology)    The following health maintenance items are reviewed in Epic and correct as of today:  Health Maintenance   Topic Date Due    LIPID  02/01/2024    ANNUAL REVIEW OF HM ORDERS  02/01/2024    INFLUENZA VACCINE (1) 09/01/2024    COVID-19 Vaccine (6 - 2024-25 season) 09/01/2024    MEDICARE ANNUAL WELLNESS VISIT  11/19/2025    FALL RISK ASSESSMENT  11/19/2025    ADVANCE CARE PLANNING  11/19/2029    DTAP/TDAP/TD IMMUNIZATION (3 - Td or Tdap) 11/05/2030    DEXA  04/19/2034    PHQ-2  "(once per calendar year)  Completed    Pneumococcal Vaccine: 65+ Years  Completed    ZOSTER IMMUNIZATION  Completed    RSV VACCINE  Completed    HPV IMMUNIZATION  Aged Out    MENINGITIS IMMUNIZATION  Aged Out    RSV MONOCLONAL ANTIBODY  Aged Out         Review of Systems  Constitutional, HEENT, cardiovascular, pulmonary, GI, , musculoskeletal, neuro, skin, endocrine and psych systems are negative, except as otherwise noted.     Objective    Exam  /78   Pulse 78   Temp 97.1  F (36.2  C) (Tympanic)   Resp 16   Ht 1.651 m (5' 5\")   Wt 63 kg (139 lb)   LMP  (LMP Unknown)   SpO2 97%   Breastfeeding No   BMI 23.13 kg/m     Estimated body mass index is 23.13 kg/m  as calculated from the following:    Height as of this encounter: 1.651 m (5' 5\").    Weight as of this encounter: 63 kg (139 lb).    Physical Exam  GENERAL: alert and no distress  EYES: Eyes grossly normal to inspection, PERRL and conjunctivae and sclerae normal  HENT: ear canals and TM's normal, nose and mouth without ulcers or lesions  NECK: no adenopathy, no asymmetry, masses, or scars  RESP: lungs clear to auscultation - no rales, rhonchi or wheezes  CV: regular rate and rhythm, normal S1 S2, no S3 or S4, systolic murmur present, click or rub, no peripheral edema  ABDOMEN: soft, nontender, no hepatosplenomegaly, no masses and bowel sounds normal  MS: no gross musculoskeletal defects noted, no edema  SKIN: no suspicious lesions or rashes  NEURO: Normal strength and tone, mentation intact and speech normal  PSYCH: mentation appears normal, affect normal/bright        11/19/2024   Mini Cog   Mini-Cog Not Completed (choose reason) Patient declines          Cognitive Screening Cognition normal during direct observation during interview and exam             Signed Electronically by: Kristen M. Kehr, PA-C    "

## 2024-11-20 LAB
ANION GAP SERPL CALCULATED.3IONS-SCNC: 10 MMOL/L (ref 7–15)
BUN SERPL-MCNC: 28.2 MG/DL (ref 8–23)
CALCIUM SERPL-MCNC: 10.1 MG/DL (ref 8.8–10.4)
CHLORIDE SERPL-SCNC: 103 MMOL/L (ref 98–107)
CHOLEST SERPL-MCNC: 156 MG/DL
CREAT SERPL-MCNC: 1.06 MG/DL (ref 0.51–0.95)
EGFRCR SERPLBLD CKD-EPI 2021: 52 ML/MIN/1.73M2
FASTING STATUS PATIENT QL REPORTED: YES
FASTING STATUS PATIENT QL REPORTED: YES
GLUCOSE SERPL-MCNC: 96 MG/DL (ref 70–99)
HCO3 SERPL-SCNC: 28 MMOL/L (ref 22–29)
HDLC SERPL-MCNC: 47 MG/DL
LDLC SERPL CALC-MCNC: 78 MG/DL
NONHDLC SERPL-MCNC: 109 MG/DL
POTASSIUM SERPL-SCNC: 4.5 MMOL/L (ref 3.4–5.3)
SODIUM SERPL-SCNC: 141 MMOL/L (ref 135–145)
TRIGL SERPL-MCNC: 153 MG/DL
VIT B12 SERPL-MCNC: 442 PG/ML (ref 232–1245)

## 2024-11-20 NOTE — RESULT ENCOUNTER NOTE
Please send a letter with results and the following:      Hello Delverna,       Enclosed is a copy of your latest test results. There is a slight elevation of your triglycerides, but not to the degree that medication needs to be adjusted.     Your kidney function also shows that you were a bit dehydrated at the time of your test. This is because you were fasting for so long prior to the blood draw. Work on drinking plenty of water throughout the day.     Your Vitamin B12 and blood counts are normal.     It was a pleasure to see you in the clinic. Continue your medications, no changes are needed. If you have any questions or concerns, please contact the clinic at [phone].    Thank you,        Kristen Kehr PA-C

## 2025-04-09 LAB
ALT SERPL-CCNC: 14 IU/L (ref 0–32)
AST SERPL-CCNC: 21 IU/L (ref 0–40)

## 2025-04-10 ENCOUNTER — TRANSFERRED RECORDS (OUTPATIENT)
Dept: MULTI SPECIALTY CLINIC | Facility: CLINIC | Age: 85
End: 2025-04-10
Payer: COMMERCIAL

## 2025-04-10 ENCOUNTER — TRANSFERRED RECORDS (OUTPATIENT)
Dept: ADMINISTRATIVE | Facility: CLINIC | Age: 85
End: 2025-04-10
Payer: COMMERCIAL

## 2025-04-11 NOTE — PROCEDURES
04/10/2025        Bria Long (Birdie)   [address] [place] [address]      Bria Long (Birdie),  :  1940    I am writing to let you know the results of the tests that were done the other day.   Thank you for allowing [facility] the opportunity to take part in your healthcare.  At [facility] we strive to provide each patient with the finest gastroenterology care available.  We hope your experience was pleasant and informative.    Your labs look great. I hope all is well with you, take care!      Hepatic Function Panel (7) 2025 10:30   Description Result Units Flags Range   Bilirubin, Total 0.6 mg/dL  0.0-1.2   Bilirubin, Direct 0.24 mg/dL  0.00-0.40   AST (SGOT) 21 IU/L  0-40   ALT (SGPT) 14 IU/L  0-32   Albumin 4.0 g/dL  3.7-4.7   Alkaline Phosphatase 73 IU/L     Protein, Total 6.7 g/dL  6.0-8.5   Comments   Performed At: [facility], [facility]  [address] [place] [address], [id number]  Isabel Dorado MD, Phone: [phone]   CBC With Differential/Platelet 2025 10:30   Description Result Units Flags Range   Hemoglobin 13.4 g/dL  11.1-15.9   Hematocrit 40.5 %  34.0-46.6   MCV 98 fL H 79-97   MCHC 33.1 g/dL  31.5-35.7   MCH 32.5 pg  26.6-33.0   RDW 13.9 %  11.7-15.4   Platelets 276 x10E3/uL  150-450   Neutrophils 82 %  Not Estab.   Lymphs 10 %  Not Estab.   Monocytes 8 %  Not Estab.   Eos 0 %  Not Estab.   Basos 0 %  Not Estab.   Neutrophils (Absolute) 8.4 x10E3/uL H 1.4-7.0   Lymphs (Absolute) 1.0 x10E3/uL  0.7-3.1   Monocytes(Absolute) 0.8 x10E3/uL  0.1-0.9   Eos (Absolute) 0.0 x10E3/uL  0.0-0.4   Baso (Absolute) 0.0 x10E3/uL  0.0-0.2   Immature Grans (Abs) 0.0 x10E3/uL  0.0-0.1   Immature Granulocytes 0 %  Not Estab.   RBC 4.12 x10E6/uL  3.77-5.28   WBC 10.2 x10E3/uL  3.4-10.8   Comments   First Available              Performed At: [place], [facility] [address], [place], [place], [id number]  Isabel Dorado MD, Phone: [phone]           Thank  you.    Electronically signed by:  Tootie SARMIENTO 04/10/2025 11:46 AM  Document generated by:  Tootie SARMIENTO  04/10/2025  If your provider ordered multiple tests; the results may not become available at the same time.  If multiple test results are received within 14 days of one another, you may receive a duplicate.  cc:  Stevo Penaloza MD

## 2025-05-19 ENCOUNTER — OFFICE VISIT (OUTPATIENT)
Dept: FAMILY MEDICINE | Facility: CLINIC | Age: 85
End: 2025-05-19
Payer: COMMERCIAL

## 2025-05-19 VITALS
SYSTOLIC BLOOD PRESSURE: 119 MMHG | HEIGHT: 65 IN | RESPIRATION RATE: 16 BRPM | DIASTOLIC BLOOD PRESSURE: 75 MMHG | HEART RATE: 69 BPM | BODY MASS INDEX: 23.49 KG/M2 | WEIGHT: 141 LBS | OXYGEN SATURATION: 96 % | TEMPERATURE: 98.1 F

## 2025-05-19 DIAGNOSIS — R00.2 HEART PALPITATIONS: ICD-10-CM

## 2025-05-19 DIAGNOSIS — K50.90 CROHN'S DISEASE WITHOUT COMPLICATION, UNSPECIFIED GASTROINTESTINAL TRACT LOCATION (H): ICD-10-CM

## 2025-05-19 DIAGNOSIS — R42 LIGHTHEADEDNESS: Primary | ICD-10-CM

## 2025-05-19 DIAGNOSIS — Z86.73 HISTORY OF CVA (CEREBROVASCULAR ACCIDENT): ICD-10-CM

## 2025-05-19 LAB
BASOPHILS # BLD AUTO: 0 10E3/UL (ref 0–0.2)
BASOPHILS NFR BLD AUTO: 1 %
EOSINOPHIL # BLD AUTO: 0.1 10E3/UL (ref 0–0.7)
EOSINOPHIL NFR BLD AUTO: 1 %
ERYTHROCYTE [DISTWIDTH] IN BLOOD BY AUTOMATED COUNT: 15.1 % (ref 10–15)
HCT VFR BLD AUTO: 41 % (ref 35–47)
HGB BLD-MCNC: 13.3 G/DL (ref 11.7–15.7)
IMM GRANULOCYTES # BLD: 0 10E3/UL
IMM GRANULOCYTES NFR BLD: 0 %
LYMPHOCYTES # BLD AUTO: 0.8 10E3/UL (ref 0.8–5.3)
LYMPHOCYTES NFR BLD AUTO: 15 %
MCH RBC QN AUTO: 31.7 PG (ref 26.5–33)
MCHC RBC AUTO-ENTMCNC: 32.4 G/DL (ref 31.5–36.5)
MCV RBC AUTO: 98 FL (ref 78–100)
MONOCYTES # BLD AUTO: 0.4 10E3/UL (ref 0–1.3)
MONOCYTES NFR BLD AUTO: 7 %
NEUTROPHILS # BLD AUTO: 4.3 10E3/UL (ref 1.6–8.3)
NEUTROPHILS NFR BLD AUTO: 77 %
PLATELET # BLD AUTO: 256 10E3/UL (ref 150–450)
RBC # BLD AUTO: 4.2 10E6/UL (ref 3.8–5.2)
WBC # BLD AUTO: 5.6 10E3/UL (ref 4–11)

## 2025-05-19 PROCEDURE — 3078F DIAST BP <80 MM HG: CPT | Performed by: PHYSICIAN ASSISTANT

## 2025-05-19 PROCEDURE — 84443 ASSAY THYROID STIM HORMONE: CPT | Performed by: PHYSICIAN ASSISTANT

## 2025-05-19 PROCEDURE — 1126F AMNT PAIN NOTED NONE PRSNT: CPT | Performed by: PHYSICIAN ASSISTANT

## 2025-05-19 PROCEDURE — 3074F SYST BP LT 130 MM HG: CPT | Performed by: PHYSICIAN ASSISTANT

## 2025-05-19 PROCEDURE — 36415 COLL VENOUS BLD VENIPUNCTURE: CPT | Performed by: PHYSICIAN ASSISTANT

## 2025-05-19 PROCEDURE — 80053 COMPREHEN METABOLIC PANEL: CPT | Performed by: PHYSICIAN ASSISTANT

## 2025-05-19 PROCEDURE — 93246 EXT ECG>7D<15D RECORDING: CPT | Performed by: PHYSICIAN ASSISTANT

## 2025-05-19 PROCEDURE — 99214 OFFICE O/P EST MOD 30 MIN: CPT | Performed by: PHYSICIAN ASSISTANT

## 2025-05-19 PROCEDURE — 85025 COMPLETE CBC W/AUTO DIFF WBC: CPT | Performed by: PHYSICIAN ASSISTANT

## 2025-05-19 ASSESSMENT — ENCOUNTER SYMPTOMS: DIZZINESS: 1

## 2025-05-19 ASSESSMENT — PAIN SCALES - GENERAL: PAINLEVEL_OUTOF10: NO PAIN (0)

## 2025-05-19 NOTE — NURSING NOTE
I have place a 14 days Zio Patch on this patient. Patient was given Zio Patch instructions and [employer] contact information. Patient understands when they should remove and return their monitor to [employer].       Momo SUTHERLAND MA

## 2025-05-19 NOTE — PROGRESS NOTES
Assessment & Plan     Lightheadedness  Heart palpitations  Check the following tests today.   She is also going to work on better nutrition. She has not lost weight in the past 6 months, but low blood sugar may be contributing to her lightheaded episodes.   The zio monitor was placed today to check for heart arhythmia. I will have the results of the lab tests today on her MyChart within the next 24 hours.   If Cardiac work up is negative, consider Neurology consultation if symptoms persist.   - Comprehensive metabolic panel (BMP + Alb, Alk Phos, ALT, AST, Total. Bili, TP); Future  - TSH with free T4 reflex; Future  - CBC with platelets and differential; Future  - ZIO PATCH 8-14 DAYS (additional cost to patient)  - ZIO PATCH 8-14 DAYS APPLICATION  - Comprehensive metabolic panel (BMP + Alb, Alk Phos, ALT, AST, Total. Bili, TP)  - TSH with free T4 reflex  - CBC with platelets and differential    History of CVA (cerebrovascular accident)      Crohn's disease without complication, unspecified gastrointestinal tract location (H)                  Salvador Fraser is a 85 year old, presenting for the following health issues:  Dizziness (Discuss dizziness off and on )      5/19/2025    10:06 AM   Additional Questions   Roomed by Momo SUTHERLAND MA     Dizziness    History of Present Illness       Reason for visit:  Dizziness off and on  Symptom onset:  More than a month  Symptom intensity:  Mild  Symptom progression:  Staying the same  Had these symptoms before:  Yes  Has tried/received treatment for these symptoms:  Norma Fraser is here today for feeling dizzy or lightheaded.   Symptoms have been occurring for a few months.   She will have waves of a dizzy / lightheaded feeling that will last for 3-4 seconds, she can be sitting, resting, active it does not seem to matter. She can have some heart palpitations at times, but not always associated with the episodes.   She has not had much of an appetite. She drinks  "fluids throughout the day, but often just grazing throughout the day. No heavy meals. She has no pain. She has not fainted.   No vision change or weakness.     She had a CVA 2008 and has poor balance as a result. CVA in 2008, percutaneous ASD closure.   She also has crohn's and well controlled - managed by Gastroenterology.   She is otherwise healthy, she walks the dog daily. She lives in her own home and has no assistance with ADLs.               Review of Systems  Constitutional, HEENT, cardiovascular, pulmonary, GI, , musculoskeletal, neuro, skin, endocrine and psych systems are negative, except as otherwise noted.      Objective    /75   Pulse 69   Temp 98.1  F (36.7  C) (Tympanic)   Resp 16   Ht 1.645 m (5' 4.75\")   Wt 64 kg (141 lb)   LMP  (LMP Unknown)   SpO2 96%   Breastfeeding No   BMI 23.65 kg/m    Body mass index is 23.65 kg/m .  Physical Exam   GENERAL: alert and no distress  EYES: Eyes grossly normal to inspection, PERRL and conjunctivae and sclerae normal  HENT: ear canals and TM's normal, nose and mouth without ulcers or lesions  NECK: no adenopathy, no asymmetry, masses, or scars  RESP: lungs clear to auscultation - no rales, rhonchi or wheezes  CV: regular rate and rhythm, normal S1 S2, no S3 or S4, no murmur, click or rub, no peripheral edema   MS: no gross musculoskeletal defects noted, no edema  SKIN: no suspicious lesions or rashes  NEURO: Normal strength and tone, sensory exam grossly normal, mentation intact, and cranial nerves 2-12 intact  PSYCH: mentation appears normal, affect normal/bright            Signed Electronically by: Kristen M. Kehr, PA-C    "

## 2025-05-19 NOTE — PATIENT INSTRUCTIONS
Boost shake or premier protein shake daily - to help with nutrition      Thyroid, electrolytes, kidney and liver function and blood counts - testing today looking for underlying reasons      Heart monitor for 4 days to look for heart rhythm issues causing dizziness

## 2025-05-20 ENCOUNTER — RESULTS FOLLOW-UP (OUTPATIENT)
Dept: FAMILY MEDICINE | Facility: CLINIC | Age: 85
End: 2025-05-20
Payer: COMMERCIAL

## 2025-05-20 PROBLEM — K50.90 CROHN'S DISEASE WITHOUT COMPLICATION, UNSPECIFIED GASTROINTESTINAL TRACT LOCATION (H): Status: RESOLVED | Noted: 2019-01-16 | Resolved: 2025-05-20

## 2025-05-20 LAB
ALBUMIN SERPL BCG-MCNC: 4 G/DL (ref 3.5–5.2)
ALP SERPL-CCNC: 64 U/L (ref 40–150)
ALT SERPL W P-5'-P-CCNC: 19 U/L (ref 0–50)
ANION GAP SERPL CALCULATED.3IONS-SCNC: 8 MMOL/L (ref 7–15)
AST SERPL W P-5'-P-CCNC: 28 U/L (ref 0–45)
BILIRUB SERPL-MCNC: 0.5 MG/DL
BUN SERPL-MCNC: 27 MG/DL (ref 8–23)
CALCIUM SERPL-MCNC: 10 MG/DL (ref 8.8–10.4)
CHLORIDE SERPL-SCNC: 106 MMOL/L (ref 98–107)
CREAT SERPL-MCNC: 0.95 MG/DL (ref 0.51–0.95)
EGFRCR SERPLBLD CKD-EPI 2021: 58 ML/MIN/1.73M2
GLUCOSE SERPL-MCNC: 100 MG/DL (ref 70–99)
HCO3 SERPL-SCNC: 27 MMOL/L (ref 22–29)
POTASSIUM SERPL-SCNC: 5.5 MMOL/L (ref 3.4–5.3)
PROT SERPL-MCNC: 7 G/DL (ref 6.4–8.3)
SODIUM SERPL-SCNC: 141 MMOL/L (ref 135–145)
TSH SERPL DL<=0.005 MIU/L-ACNC: 1.15 UIU/ML (ref 0.3–4.2)

## 2025-05-20 NOTE — RESULT ENCOUNTER NOTE
Please send a letter with results and the following:    Franklin Fraser is a copy of your latest test results.     Your potassium was a bit high this time, but is not the cause of your symptoms. Your kidney function is better than it has been in the past with improvement in your creatinine.   A blood sugar of 100 is normal for a nonfasting test.     Your thyroid test is normal.     You are not iron deficient.     Complete the heart monitor test and I will get the results to you as soon as they are available.     If you have any questions or concerns, please contact the clinic at [phone].    Thank you,        Kristen Kehr PA-C

## 2025-05-20 NOTE — LETTER
May 20, 2025      Bria Long  [address]  [place] [address]        Dear Bria,       Enclosed is a copy of your latest test results.      Your potassium was a bit high this time, but is not the cause of your symptoms. Your kidney function is better than it has been in the past with improvement in your creatinine.   A blood sugar of 100 is normal for a nonfasting test.      Your thyroid test is normal.      You are not iron deficient.      Complete the heart monitor test and I will get the results to you as soon as they are available.      Resulted Orders   Comprehensive metabolic panel (BMP + Alb, Alk Phos, ALT, AST, Total. Bili, TP)   Result Value Ref Range    Sodium 141 135 - 145 mmol/L    Potassium 5.5 (H) 3.4 - 5.3 mmol/L    Carbon Dioxide (CO2) 27 22 - 29 mmol/L    Anion Gap 8 7 - 15 mmol/L    Urea Nitrogen 27.0 (H) 8.0 - 23.0 mg/dL    Creatinine 0.95 0.51 - 0.95 mg/dL    GFR Estimate 58 (L) >60 mL/min/1.73m2      Comment:      eGFR calculated using 2021 CKD-EPI equation.    Calcium 10.0 8.8 - 10.4 mg/dL    Chloride 106 98 - 107 mmol/L    Glucose 100 (H) 70 - 99 mg/dL    Alkaline Phosphatase 64 40 - 150 U/L    AST 28 0 - 45 U/L    ALT 19 0 - 50 U/L    Protein Total 7.0 6.4 - 8.3 g/dL    Albumin 4.0 3.5 - 5.2 g/dL    Bilirubin Total 0.5 <=1.2 mg/dL   TSH with free T4 reflex   Result Value Ref Range    TSH 1.15 0.30 - 4.20 uIU/mL   CBC with platelets and differential   Result Value Ref Range    WBC Count 5.6 4.0 - 11.0 10e3/uL    RBC Count 4.20 3.80 - 5.20 10e6/uL    Hemoglobin 13.3 11.7 - 15.7 g/dL    Hematocrit 41.0 35.0 - 47.0 %    MCV 98 78 - 100 fL    MCH 31.7 26.5 - 33.0 pg    MCHC 32.4 31.5 - 36.5 g/dL    RDW 15.1 (H) 10.0 - 15.0 %    Platelet Count 256 150 - 450 10e3/uL    % Neutrophils 77 %    % Lymphocytes 15 %    % Monocytes 7 %    % Eosinophils 1 %    % Basophils 1 %    % Immature Granulocytes 0 %    Absolute Neutrophils 4.3 1.6 - 8.3 10e3/uL    Absolute Lymphocytes 0.8 0.8 -  5.3 10e3/uL    Absolute Monocytes 0.4 0.0 - 1.3 10e3/uL    Absolute Eosinophils 0.1 0.0 - 0.7 10e3/uL    Absolute Basophils 0.0 0.0 - 0.2 10e3/uL    Absolute Immature Granulocytes 0.0 <=0.4 10e3/uL     If you have any questions or concerns, please call the clinic at the number listed above.     Sincerely,        Kristen M Kehr, PA-C/bmc    Electronically signed

## 2025-07-02 LAB — CV ZIO PRELIM RESULTS: NORMAL

## 2025-07-07 ENCOUNTER — TRANSFERRED RECORDS (OUTPATIENT)
Dept: MULTI SPECIALTY CLINIC | Facility: CLINIC | Age: 85
End: 2025-07-07

## 2025-07-07 ENCOUNTER — TELEPHONE (OUTPATIENT)
Dept: FAMILY MEDICINE | Facility: CLINIC | Age: 85
End: 2025-07-07

## 2025-07-07 DIAGNOSIS — R94.31 ABNORMAL PATIENT-ACTIVATED CARDIAC EVENT MONITOR: Primary | ICD-10-CM

## 2025-07-07 LAB
ALT SERPL-CCNC: 14 IU/L (ref 0–32)
AST SERPL-CCNC: 21 IU/L (ref 0–40)
CV ZIO PRELIM RESULTS: NORMAL

## 2025-07-07 NOTE — TELEPHONE ENCOUNTER
Nursing:    Please contact Bria and let her know that I received results of her heart monitor test from May.   (I don't have a reason why results are so late).   There were 22 times that her heart rate was fast and a some other abnormal hear rhythms reported. None of them lasted very long, but I would like her to have a consultation with Cardiology and will place a referral for her to schedule a consultation appointment. Here is contact information for her to schedule. [phone].     Thank you,  Kristen Kehr PA-C

## 2025-07-08 ENCOUNTER — TRANSFERRED RECORDS (OUTPATIENT)
Dept: ADMINISTRATIVE | Facility: CLINIC | Age: 85
End: 2025-07-08
Payer: COMMERCIAL

## 2025-07-08 NOTE — TELEPHONE ENCOUNTER
This writer attempted to contact Bria on 07/08/25      Reason for call ZIO patch results, provider instructions  and unable to leave message, voicemail box isn't set up and pt didn't answer.      If patient calls back:   Registered Nurse called. Send to RN team.  Relay provider's message and instructions below. Provide cardiology scheduling #.       Please contact Bria and let her know that I received results of her heart monitor test from May.   (I don't have a reason why results are so late).   There were 22 times that her heart rate was fast and a some other abnormal hear rhythms reported. None of them lasted very long, but I would like her to have a consultation with Cardiology and will place a referral for her to schedule a consultation appointment. Here is contact information for her to schedule. [phone].      Thank you,  Kristen Kehr PA-C Lindsay Schuhwerck, RN  Clinical Triage/Primary Care  [facility]

## 2025-07-09 NOTE — PROCEDURES
2025        Bria (Dinora) Mercedes   [address] [place] [address]      Bria (Dinora) Mercedes,  :  1940    Your labs are acceptable. Please continue to follow the routine lab protocol.  Hepatic Function Panel (7) 2025 12:48   Description Result Units Flags Range   Bilirubin, Total 0.3 mg/dL  0.0-1.2   Bilirubin, Direct 0.15 mg/dL  0.00-0.40   AST (SGOT) 21 IU/L  0-40   ALT (SGPT) 14 IU/L  0-32   Albumin 3.8 g/dL  3.7-4.7   Alkaline Phosphatase 66 IU/L     Protein, Total 6.5 g/dL  6.0-8.5   Comments   Performed At: , [facility]  [address], [place], [place], [id number]  Isabel Dorado MD, Phone: [phone]   CBC With Differential/Platelet 2025 12:48   Description Result Units Flags Range   Hemoglobin 12.7 g/dL  11.1-15.9   Hematocrit 39.5 %  34.0-46.6    fL H 79-97   MCHC 32.2 g/dL  31.5-35.7   MCH 32.5 pg  26.6-33.0   RDW 14.6 %  11.7-15.4   Platelets 256 x10E3/uL  150-450   Neutrophils 74 %  Not Estab.   Lymphs 18 %  Not Estab.   Monocytes 7 %  Not Estab.   Eos 1 %  Not Estab.   Basos 0 %  Not Estab.   Neutrophils (Absolute) 4.3 x10E3/uL  1.4-7.0   Lymphs (Absolute) 1.1 x10E3/uL  0.7-3.1   Monocytes(Absolute) 0.4 x10E3/uL  0.1-0.9   Eos (Absolute) 0.1 x10E3/uL  0.0-0.4   Baso (Absolute) 0.0 x10E3/uL  0.0-0.2   Immature Grans (Abs) 0.0 x10E3/uL  0.0-0.1   Immature Granulocytes 0 %  Not Estab.   RBC 3.91 x10E6/uL  3.77-5.28   WBC 5.9 x10E3/uL  3.4-10.8   Comments   First Available              Performed At: , [facility]  [address] [place] [address], [place], [place], [id number]  Isabel Dorado MD, Phone: [phone]           Thank you.    Electronically signed by:  Hermelinda SARMIENTO 2025 05:52 PM  Document generated by:  Hermelinda SARMIENTO  2025  If your provider ordered multiple tests; the results may not become available at the same time.  If multiple test results are received within 14 days of one another, you may receive a  duplicate.  cc:  Stevo Penaloza MD

## 2025-07-09 NOTE — TELEPHONE ENCOUNTER
Pt notified of provider message as written.  Pt verbalized good understanding.  Lynn Camarillo BSN, RN

## 2025-08-28 ENCOUNTER — OFFICE VISIT (OUTPATIENT)
Dept: CARDIOLOGY | Facility: CLINIC | Age: 85
End: 2025-08-28
Attending: PHYSICIAN ASSISTANT
Payer: COMMERCIAL

## 2025-08-28 VITALS
BODY MASS INDEX: 22.64 KG/M2 | OXYGEN SATURATION: 97 % | WEIGHT: 135 LBS | SYSTOLIC BLOOD PRESSURE: 103 MMHG | HEART RATE: 89 BPM | DIASTOLIC BLOOD PRESSURE: 66 MMHG

## 2025-08-28 DIAGNOSIS — R94.31 ABNORMAL PATIENT-ACTIVATED CARDIAC EVENT MONITOR: ICD-10-CM
